# Patient Record
Sex: MALE | Race: WHITE
[De-identification: names, ages, dates, MRNs, and addresses within clinical notes are randomized per-mention and may not be internally consistent; named-entity substitution may affect disease eponyms.]

---

## 2018-02-25 ENCOUNTER — HOSPITAL ENCOUNTER (INPATIENT)
Dept: HOSPITAL 80 - FED | Age: 83
LOS: 15 days | Discharge: NURSING, REHAB OR LONG-TERM CARE | DRG: 871 | End: 2018-03-12
Attending: INTERNAL MEDICINE | Admitting: INTERNAL MEDICINE
Payer: COMMERCIAL

## 2018-02-25 DIAGNOSIS — E87.8: ICD-10-CM

## 2018-02-25 DIAGNOSIS — I48.91: ICD-10-CM

## 2018-02-25 DIAGNOSIS — G93.41: ICD-10-CM

## 2018-02-25 DIAGNOSIS — B96.1: ICD-10-CM

## 2018-02-25 DIAGNOSIS — L89.152: ICD-10-CM

## 2018-02-25 DIAGNOSIS — E43: ICD-10-CM

## 2018-02-25 DIAGNOSIS — E11.10: ICD-10-CM

## 2018-02-25 DIAGNOSIS — R65.20: ICD-10-CM

## 2018-02-25 DIAGNOSIS — N17.9: ICD-10-CM

## 2018-02-25 DIAGNOSIS — E87.5: ICD-10-CM

## 2018-02-25 DIAGNOSIS — E87.2: ICD-10-CM

## 2018-02-25 DIAGNOSIS — J18.9: ICD-10-CM

## 2018-02-25 DIAGNOSIS — E87.0: ICD-10-CM

## 2018-02-25 DIAGNOSIS — R00.0: ICD-10-CM

## 2018-02-25 DIAGNOSIS — J96.01: ICD-10-CM

## 2018-02-25 DIAGNOSIS — A41.89: Primary | ICD-10-CM

## 2018-02-25 DIAGNOSIS — J10.00: ICD-10-CM

## 2018-02-25 DIAGNOSIS — B97.89: ICD-10-CM

## 2018-02-25 DIAGNOSIS — I10: ICD-10-CM

## 2018-02-25 DIAGNOSIS — Z66: ICD-10-CM

## 2018-02-25 LAB
INR PPP: 1.14 (ref 0.83–1.16)
PLATELET # BLD: 264 10^3/UL (ref 150–400)
PROTHROMBIN TIME: 14.8 SEC (ref 12–15)

## 2018-02-25 PROCEDURE — C1751 CATH, INF, PER/CENT/MIDLINE: HCPCS

## 2018-02-25 PROCEDURE — P9041 ALBUMIN (HUMAN),5%, 50ML: HCPCS

## 2018-02-25 PROCEDURE — G0480 DRUG TEST DEF 1-7 CLASSES: HCPCS

## 2018-02-25 NOTE — PDGENHP
History and Physical





- Chief Complaint


altered mental status





- History of Present Illness





Source-patient currently encephalopathic and nonverbal.  EMR was reviewed and 

case discussed with accepting provider.  Patient resides at [facility] and no records accompany the patient.  Calls to facility 

placed but no records available to be faxed either as patient is on [facility] facility side. .





HPI - 82-year-old gentleman with unknown past medical history who presents 

emergency department from his independent [facility] apartment at [facility] after 

welfare check was called.  Apparently patient had not been seen in 24 hr last 

being approximately dinnertime.  Patient was found in his apartment confused.  

Per report no emptied or prescription bottles were in the home.  Initial blood 

glucose on scene was greater than measurable on Accu-Chek.





In the emergency department patient's glucose was found to be greater than 

1000. He was given greater than 4 L of normal saline bolus.  Patient has 

remained confused and agitated.





History Information





- Allergies/Home Medication List


Allergies/Adverse Reactions: 








Unable to Assess Allergy (Unverified 18 20:11)


 





Home Medications: 








Meclizine HCl  18 [Last Taken Unknown]


Zofran  18 [Last Taken Unknown]





I have personally reviewed and updated: social history


Past Medical History: Unable to obtain secondary to patient's altered mental 

status.  Attempts to call contacts





- Surgical History


Additional surgical history: Unable to obtain secondary to patient's mental 

status.  No reports available from [facility].





- Family History


Additional family history: Unable to obtain as noted above.





- Social History


Smoking Status: Unknown if ever smoked


Additional social history: Unable to obtain as noted above.  Patient without 

any available advance directives as it is not required at [facility].  Patient will be full code until this can be clarified.  

Patient's available contacts are all .





Review of Systems


Review of Systems: 


Unable to obtain as for reasons noted above.





Physical Exam


Physical Exam: 








 Selected Entries











  18





  21:37


 


Blood Pressure Automatic





Method 


 


Heart Rate 102 H


 


Respiratory 32 H





Rate 


 


O2 Sat (%) 94


 


Blood Pressure 113/68


 


Mean Arterial 83





Pressure (MAP) 


 


O2 Delivery Room Air





Mode 




















Temp Pulse Resp BP Pulse Ox


 


 35.8 C L  113 H  32 H  139/66 H  93 


 


 18 22:58  18 22:58  18 22:58  18 22:58  18 22:58




















O2 (L/minute)                  4














Constitutional: no apparent distress, chronically ill appearing, cachectic, 

other (Patient lays in bed awake but unresponsive.  Moans intermittently.  

Nonverbal.  Chronically ill, thin cachectic, disheveled.), No appears nourished


Eyes: PERRL


Ears, Nose, Mouth, Throat: no oral mucosal ulcers, dry mucous membranes, other (

Edentulous)


Cardiovascular: systolic murmur, pulses symmetric bilaterally, tachycardia (

Regular rhythm with occasional extra beat.), No edema


Peripheral Pulses: 2+: dorsalis-pedis (R), dorsalis-pedis (L)


Respiratory: no respiratory distress, no rales or rhonchi, clear to auscultation

, reduced air movement (Decreased inspiratory effort.  Patient unable to follow 

commands.), No expiratory wheeze, No respiratory distress


Gastrointestinal: normoactive bowel sounds, soft, non-tender abdomen, no 

palpable masses, No tenderness (No apparent tenderness to palpation with exam.)

, No guarding, No distension


Genitourinary: kothari in urethra (Clear yellow urine)


Skin: warm, normal color, No no rashes or abrasions, No pressure ulcer, No rash


Musculoskeletal: other (Patient moves all extremities while lying in bed.  

Strength 5/5 in his extremities.  Patient unable to follow commands for testing.

)


Neurologic: other ( limited exam 2/2 confusion and inability to follow commands)

, No AAOx3, No weakness, No facial droop


Psychiatric: encephalopathic, agitated (Patient resisting nursing staff 

assistance.  Nonverbal.), No interacting appropriately


Lymph, Heme, Immunologic: No petechiae





Lab Data & Imaging Review





 18 20:12





 18 22:00














WBC  14.63 10^3/uL (3.80-9.50)  H  18  20:12    


 


RBC  6.30 10^6/uL (4.40-6.38)   18  20:12    


 


Hgb  20.8 g/dL (13.7-17.5)  H*  18  20:12    


 


POC Hgb  17.0 gm/dL (13.7-17.5)   18  21:54    


 


Hct  62.4 % (40.0-51.0)  H*  18  20:12    


 


POC Hct  50 % (40-51)   18  21:54    


 


MCV  99.0 fL (81.5-99.8)   18  20:12    


 


MCH  33.0 pg (27.9-34.1)   18  20:12    


 


MCHC  33.3 g/dL (32.4-36.7)   18  20:12    


 


RDW  14.0 % (11.5-15.2)   18  20:12    


 


Plt Count  264 10^3/uL (150-400)   18  20:12    


 


MPV  12.4 fL (8.7-11.7)  H  18  20:12    


 


Neut % (Auto)  81.5 % (39.3-74.2)  H  18  20:12    


 


Lymph % (Auto)  6.8 % (15.0-45.0)  L  18  20:12    


 


Mono % (Auto)  10.7 % (4.5-13.0)   18  20:    


 


Eos % (Auto)  0.0 % (0.6-7.6)  L  18  20:    


 


Baso % (Auto)  0.2 % (0.3-1.7)  L  18  20:12    


 


Nucleat RBC Rel Count  0.0 % (0.0-0.2)   18  20:12    


 


Absolute Neuts (auto)  11.92 10^3/uL (1.70-6.50)  H  18  20:12    


 


Absolute Lymphs (auto)  1.00 10^3/uL (1.00-3.00)   18  20:12    


 


Absolute Monos (auto)  1.56 10^3/uL (0.30-0.80)  H  18  20:12    


 


Absolute Eos (auto)  0.00 10^3/uL (0.03-0.40)  L  18  20:12    


 


Absolute Basos (auto)  0.03 10^3/uL (0.02-0.10)   18  20:    


 


Absolute Nucleated RBC  0.00 10^3/uL (0-0.01)   02/25/18  20:12    


 


Immature Gran %  0.8 % (0.0-1.1)   18  20:12    


 


Immature Gran #  0.12 10^3/uL (0.00-0.10)  H  18  20:12    


 


PT  14.8 SEC (12.0-15.0)   18  20:12    


 


INR  1.14  (0.83-1.16)   18  20:12    


 


APTT  31.9 SEC (23.0-38.0)   18  20:12    


 


VBG pH  7.14  (7.31-7.42)  L*  18  20:12    


 


VBG Lactic Acid  4.4 mmol/L (0.7-2.1)  H  18  21:19    


 


POC Sodium  153 mEq/L (135-145)  H  18  21:54    


 


Sodium  155 mEq/L (135-145)  H  18  22:00    


 


POC Potassium  5.3 mEq/L (3.3-5.0)  H  18  21:54    


 


Potassium  5.4 mEq/L (3.5-5.2)  H  18  22:00    


 


POC Chloride  117 mEq/L ()  H  18  21:54    


 


Chloride  113 mEq/L ()  H D 18  22:00    


 


Carbon Dioxide  12 mEq/l (22-31)  L  18  22:00    


 


Anion Gap  30 mEq/L (8-16)  H  18  22:00    


 


POC BUN  98 mg/dL (7-23)  H  18  21:54    


 


BUN  100 mg/dL (7-23)  H  18  22:00    


 


Creatinine  3.3 mg/dL (0.7-1.3)  H  18  22:00    


 


POC Creatinine  3.1 mg/dL (0.7-1.3)  H  18  21:54    


 


Estimated GFR  18   18  22:00    


 


Glucose  859 mg/dL ()  H*  18  22:00    


 


POC Glucose  > 700 mg/dL ()  H*  18  21:54    


 


Serum Osmolality  429 mosmo/kg (280-297)  H  18  20:12    


 


Calcium  8.3 mg/dL (8.5-10.4)  L D 18  22:00    


 


Phosphorus  11.7 mg/dL (2.5-4.5)  H  18  20:12    


 


Magnesium  3.9 mg/dL (1.6-2.3)  H  18  20:12    


 


Total Bilirubin  1.0 mg/dL (0.1-1.4)   18  22:00    


 


Conjugated Bilirubin  0.9 mg/dL (0.0-0.5)  H  18  22:00    


 


Unconjugated Bilirubin  0.1 mg/dL (0.0-1.1)   18  22:00    


 


AST  29 IU/L (17-59)   18  22:00    


 


ALT  22 IU/L (21-72)   18  22:00    


 


Alkaline Phosphatase  93 IU/L ()   18  22:00    


 


Troponin I  0.023 ng/mL (0.000-0.034)   18  20:12    


 


Total Protein  6.7 g/dL (6.3-8.2)   18  22:00    


 


Albumin  4.1 g/dL (3.5-5.0)   18  22:00    


 


Beta-Hydroxybutyrate  9.35 mmol/L (0.02-0.27)  H  18  20:12    


 


Urine Color  YELLOW   18  20:25    


 


Urine Appearance  CLEAR   18  20:25    


 


Urine pH  5.0  (5.0-7.5)   18:25    


 


Ur Specific Gravity  1.021  (1.002-1.030)   18  20:25    


 


Urine Protein  1+  (NEGATIVE)  H  18  20:25    


 


Urine Ketones  TRACE  (NEGATIVE)  H  18  20:    


 


Urine Blood  3+  (NEGATIVE)  H  18  20:25    


 


Urine Nitrate  NEGATIVE  (NEGATIVE)   18  20:    


 


Urine Bilirubin  NEGATIVE  (NEGATIVE)   18  20:    


 


Urine Urobilinogen  NEGATIVE EU (0.2-1.0)   18  20:25    


 


Ur Leukocyte Esterase  NEGATIVE  (NEGATIVE)   18  20:25    


 


Urine RBC  1-3 /hpf (0-3)   18  20:25    


 


Urine WBC  3-5 /hpf (0-3)  H  18  20:25    


 


Ur Epithelial Cells  TRACE /lpf (NONE-1+)   18  20:25    


 


Hyaline Casts  1-5 /lpf (0-1)   18  20:25    


 


Urine Mucus  TRACE /lpf (NONE-1+)   18  20:25    


 


Urine Glucose  3+  (NEGATIVE)  H  18  20:25    








Imaging Review: 





 


Portable Chest at 8 hours 


 


 History: Chest Pain. Altered mental status. 


 


 Comparison: None available. 


 


 Findings: Lung volumes are low without focal consolidation, pneumothorax, or 

pleural effusion. 


Heart size is normal. Degenerative change is present in the spine and 

shoulders. 


 


 


Impression: No acute findings in the chest. 


______________________





CT Head Without Contrast 


 


 History: AMS. Hyperglycemia. 


 


 Comparison: None available. 


 


 Technique: Axial unenhanced images were obtained from the vertex through the 

skull base.  Dose 


reduction techniques were utilized. 


 


 Findings: Focal CSF prominence just to the left of the falx in the vertex, 

measuring 2.5 x 1.5 cm (


series 9 image 101) could represent an arachnoid cyst. Gray-white 

differentiation is preserved. 


There is moderate diffuse cerebral atrophy with scattered periventricular and 

subcortical low 


attenuation, suggesting chronic microvascular ischemic gliosis. No intracranial 

hemorrhage is 


identified. There is no evidence of infarct.  Atherosclerotic calcification is 

present in the 


distal internal carotid arteries. The skull and skull base are unremarkable. 

Mild mucous membrane 


thickening is present in the paranasal sinuses.  The mastoid air cells are 

clear. 


 


 


Impression: 


1. No acute intracranial findings. 


2. Diffuse cerebral atrophy with periventricular and subcortical low 

attenuation consistent with 


chronic microvascular ischemic gliosis. 


3. 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 


 


Findings discussed with Yaron Suero MD on 2018 at 2131 hours. 


 





Visualized and Interpreted Chest x-ray results: Yes


Visualized and Interpreted imaging results: Yes


Visualized and Interpreted EKG results: Yes


EKG additional interpertation: Initial EKG-showing AFib with RVR in the 120s, 

PVCs, Q-waves in the inferior leads with prolonged QTC.  Repeat EKG showed 

sinus tachycardia in the 1 teens with PVCs and Q-waves in the inferior leads.  

QTC is 478. No acute ST elevations or depressions.





Assessment & Plan


Assessment: 








82-year-old gentleman found confused at his independent living apartment with 

elevated blood sugars





Hyperglycemia-HHS versus DKA.  Patient's blood sugar initially was greater than 

1000 however he has elevated serum ketones.  Patient has been placed on the DKA 

protocol and has received aggressive IV fluid hydration of 4.5 L normal saline.

  Patient will continue on insulin drip.  Serial BMPs, VBG, lactic acid.  Labs 

are all down trending except for sodium.  See discussion below.





Acute encephalopathy - likely related to hyperglycemia versus metabolic 

acidosis versus azotemia versus less likely CVA.  Despite aggressive IV fluid 

hydration and improvement in patient's glucose up to this point patient remains 

quite agitated and unresponsive to verbal cues.  Patient with increasing 

agitation and requiring restraint with 2 point soft limb restraints and 

Precedex.  Continuing with insulin and acidosis correction as noted.  Attempts 

were made to contact patient's listed contacts however apparently they are 

.  Case Management will be consulted to assist.





Severe sepsis-patient qualifies with leukocytosis, tachycardia, elevated lactate

, acute renal failure - chest x-ray, UA at this point are negative for evidence 

of acute infectious process.  Blood cultures x2 are pending.  Will hold off on 

any antibiotics at this time unless patient should develop fever or cultures 

returned positive.





Lactic acidosis - downtrending after IV fluids continue with aggressive 

hydration as noted.  Further evaluation for potential source most likely severe 

dehydration versus infectious process.  Blood cultures are pending as noted 

above.  Serial lactic acids.





Hypernatremia - patient initially hypernatremic to 1 50.  Status post 4.5 L a 

normal saline in the emergency department now with a sodium of 160. His fluids 

had initially been changed to half normal saline at 125 mLs/hour  Patient has 

been making urine since receiving IV fluids.  Urine studies have been added.  

Nephrology consulted and case discussed with .  Recommends changed 

to D5W as patient is blood glucose is downtrending and on insulin drip at 1:25 

a.m. MLS per hour as well.  Will continue to monitor serial BMPs.  Nephrology 

will see patient in the morning. 





Acute renal failure - most likely prerenal in setting of severe dehydration.  

Patient has not had any issues with hypotension since his arrival.  His initial 

blood pressure however was low normal and has since increased since arrival to 

the unit after IV fluids.  Repeat before and BMPs show improvement in renal 

function.





Atrial fibrillation (Acute) - patient appears to be normal sinus rhythm status 

post IV fluids and initiation of glucose correction.  Will repeat a EKG in the 

morning and obtain echocardiogram.  Patient's previous history is unknown.  PT 

INR within normal limits.





Hypothermia (Acute) - resolved status post bear hugger.  The sepsis eval as 

noted above.  Patient has been able to maintain normalized temperature.





Polycythemia - secondary to severe dehydration.  Currently corrected on repeat 

laboratory studies.  Continue IV fluids.





Hyperkalemia - status post aggressive IV fluid hydration and now corrected.  

Patient does not need further treatment other than IV fluids.





Hyperchloremia - status post 4 L normal saline.  transitioned to D5 W. Serial 

BMPs.





Microscopic hematuria - possibly related to traumatic Kothari placement.  Patient 

without any gross hematuria at this time.  No previous records available for 

comparison.  Consider renal imaging in the morning pending repeat renal 

function labs.





Under weight-patient's current weight is 54.4 kg.  He does appear slightly 

cachectic.  Once patient's mentation improves any can safely have a diet will 

consult RD.





FEN - IVF as noted above. electrolyte replacement if needed currently 

acceptable. Diet NPO. 


PPX - SCDs. lovenox. 


COR - FULL at this time.  Despite attempts for staff to locate any advanced 

directives as patient was on the independent living side these are not required 

to be filed.  Patient's listed contacts are thought to be .


Dispo - patient admitted to ICU.  He is critically ill requiring close 

monitoring.  Patient has been admitted to inpatient status and anticipate 

greater than 2 midnight stay.





Consult-nephrology.  Case Management.

## 2018-02-25 NOTE — CPEKG
Heart Rate: 116

RR Interval: 517

P-R Interval: 156

QRSD Interval: 76

QT Interval: 344

QTC Interval: 478

P Axis: -2

QRS Axis: -30

T Wave Axis: 38

EKG Severity - ABNORMAL ECG -

EKG Impression: SINUS TACHYCARDIA

EKG Impression: MULTIFORM VENTRICULAR PREMATURE COMPLEXES

EKG Impression: PROBABLE INFERIOR INFARCT, OLD

Electronically Signed By: Yaron Suero 25-Feb-2018 23:28:42

## 2018-02-25 NOTE — CPEKG
Heart Rate: 121

RR Interval: 496

QRSD Interval: 106

QT Interval: 384

QTC Interval: 545

QRS Axis: 1

T Wave Axis: 60

EKG Severity - ABNORMAL ECG -

EKG Impression: ATRIAL FIBRILLATION

EKG Impression: MULTIFORM VENTRICULAR PREMATURE COMPLEXES

EKG Impression: PROBABLE INFERIOR INFARCT, OLD

EKG Impression: PROLONGED QT INTERVAL

Electronically Signed By: Yaron Suero 25-Feb-2018 23:28:48

## 2018-02-25 NOTE — EDPHY
H & P


Time Seen by Provider: 02/25/18 20:14


HPI/ROS: 





Chief complaint.  Altered mental status





HPI.  82-year-old male here emergent by EMS.  He was last seen normal 24 hr 

ago.  Welfare check found the patient to be barely arousable in his apartment 

in assisted living.  Monitor showed atrial fibrillation per EMS.  When they 

checked his blood sugar there monitor showed it was greater than 400. There is 

a report that there was no medicine bottles around.  Apparently there is no 

history of diabetes.  Patient is not able to give any further history.





ROS--unable as the patient is nonverbal





Past Medical/Surgical History: 





Unknown


Social History: 





Unknown though EMS report is the patient lives by himself in assisted living


Smoking Status: Unknown if ever smoked


Physical Exam: 





General Appearance:  Arousable as the patient opens his eyes to verbal stimuli.

  Patient's initial temp is 34.7 degrees.  Heart rate 103


Eyes: Pupils equal and round no pallor or injection.


ENT, mucous membranes are dry


Respiratory:  There are no retractions, lungs are clear to auscultation.


Cardiovascular:  Irregularly irregular rate and rhythm


Gastrointestinal:   Abdomen is soft and nontender, no masses, bowel sounds 

normal.


Neurological:  Opens eyes to verbal stimuli.  Appears to move all 4 extremities


Skin:  Skin is mottled


Musculoskeletal:  Neck is supple nontender.


Extremities  symmetrical, full range of motion.


Psychiatric:  Arousable


Constitutional: 


 Initial Vital Signs











Temperature (C)  34.7 C L  02/25/18 20:09


 


Heart Rate  103 H  02/25/18 20:09


 


Respiratory Rate  33 H  02/25/18 20:09


 


Blood Pressure  156/80 H  02/25/18 20:09


 


O2 Sat (%)  96   02/25/18 20:09








 











O2 Delivery Mode               Nasal Cannula


 


O2 (L/minute)                  4














Allergies/Adverse Reactions: 


 





Unable to Assess Allergy (Unverified 02/25/18 20:11)


 








Home Medications: 














 Medication  Instructions  Recorded


 


Meclizine HCl  02/25/18


 


Zofran  02/25/18














Medical Decision Making





- Diagnostics


EKG Interpretation: 





EKG interpreted by me shows atrial fibrillation left axis deviation QRS appears 

normal.  No obvious ST elevation or depression.  Ventricular response is 121


Imaging Results: 


 Imaging Impressions





Chest X-Ray  02/25/18 20:15


Impression: No acute findings in the chest.


 


 


 








Head CT  02/25/18 20:20


Impression:


1. No acute intracranial findings.


2. Diffuse cerebral atrophy with periventricular and subcortical low 

attenuation consistent with chronic microvascular ischemic gliosis.


3. 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


 


Findings discussed with Yaron Suero MD on February 25, 2018 at 2131 hours.








Chest x-ray reviewed by me and interpreted by me shows no evidence for pneumonia





Noncontrast head CT reviewed by me and discussed with Dr. Aguilar is nonacute


Procedures: 





IV normal saline.  2nd IV is started is given 2 L of normal saline.  I-STAT 

shows blood sugar greater than 700





Sepsis workup is performed





Patient is given 4 L of saline in the department.  Insulin drip.


ED Course/Re-evaluation: 





Severe sepsis declared at about 8:50 p.m.





Bear Hugger blanket to warm the patient back to normothermia





10:15 p.m. Patient has now rapid AFib in the range of 140-150.





Diltiazem bolus and drip.





I consulted and discussed case with Dr. Macedo, hospitalist, who agrees to the 

admission








Differential Diagnosis: 





It appears the patient is in nonketotic hyperosmolar coma.  However the patient 

is acidotic however I suspect that this is more from for circulation as well as 

this is the explanation for the elevated lactate.  Concurrently there is no 

obvious evidence for infection in terms of pneumonia or urinary tract 

infection.  Cultures are pending.  No antibiotics were given because again at 

this point no source of infection.





Patient has atrial fibrillation with rapid ventricular rate requiring


Diltiazem for management.


Patient's very hyperglycemic as well as having elevated oz mole allergy.  We 

this requires treatment with IV insulin and IV fluids.


Critical Care Time: 





Critical care time exclusive procedures 1 hr





- Data Points


Laboratory Results: 


 Laboratory Results





 02/25/18 20:12 





 02/25/18 20:12 





 











  02/25/18 02/25/18 02/25/18





  20:25 20:12 20:12


 


WBC      





    


 


RBC      





    


 


Hgb      





    


 


POC Hgb      





    


 


Hct      





    


 


POC Hct      





    


 


MCV      





    


 


MCH      





    


 


MCHC      





    


 


RDW      





    


 


Plt Count      





    


 


MPV      





    


 


Neut % (Auto)      





    


 


Lymph % (Auto)      





    


 


Mono % (Auto)      





    


 


Eos % (Auto)      





    


 


Baso % (Auto)      





    


 


Nucleat RBC Rel Count      





    


 


Absolute Neuts (auto)      





    


 


Absolute Lymphs (auto)      





    


 


Absolute Monos (auto)      





    


 


Absolute Eos (auto)      





    


 


Absolute Basos (auto)      





    


 


Absolute Nucleated RBC      





    


 


Immature Gran %      





    


 


Immature Gran #      





    


 


PT      





    


 


INR      





    


 


APTT      





    


 


VBG pH    7.14  L*   





    (7.31-7.42)  


 


VBG Lactic Acid    6.4 mmol/L H mmol/L  





    (0.7-2.1)  


 


POC Sodium      





    


 


Sodium      152 mEq/L H mEq/L





     (135-145) 


 


POC Potassium      





    


 


Potassium      6.1 mEq/L H mEq/L





     (3.5-5.2) 


 


POC Chloride      





    


 


Chloride      102 mEq/L mEq/L





     () 


 


Carbon Dioxide      9 mEq/l L* mEq/l





     (22-31) 


 


Anion Gap      41 mEq/L H mEq/L





     (8-16) 


 


POC BUN      





    


 


BUN      101 mg/dL H* mg/dL





     (7-23) 


 


Creatinine      3.6 mg/dL H mg/dL





     (0.7-1.3) 


 


POC Creatinine      





    


 


Estimated GFR      16 





    


 


Glucose      1023 mg/dL H* mg/dL





     () 


 


POC Glucose      





    


 


Serum Osmolality      429 mosmo/kg H mosmo/kg





     (280-297) 


 


Calcium      10.2 mg/dL mg/dL





     (8.5-10.4) 


 


Phosphorus      11.7 mg/dL H mg/dL





     (2.5-4.5) 


 


Magnesium      3.9 mg/dL H mg/dL





     (1.6-2.3) 


 


Total Bilirubin      1.4 mg/dL mg/dL





     (0.1-1.4) 


 


Troponin I      0.023 ng/mL ng/mL





     (0.000-0.034) 


 


Beta-Hydroxybutyrate      9.35 mmol/L H mmol/L





     (0.02-0.27) 


 


Urine Color  YELLOW     





    


 


Urine Appearance  CLEAR     





    


 


Urine pH  5.0     





   (5.0-7.5)   


 


Ur Specific Gravity  1.021     





   (1.002-1.030)   


 


Urine Protein  1+  H     





   (NEGATIVE)   


 


Urine Ketones  TRACE  H     





   (NEGATIVE)   


 


Urine Blood  3+  H     





   (NEGATIVE)   


 


Urine Nitrate  NEGATIVE     





   (NEGATIVE)   


 


Urine Bilirubin  NEGATIVE     





   (NEGATIVE)   


 


Urine Urobilinogen  NEGATIVE EU EU    





   (0.2-1.0)   


 


Ur Leukocyte Esterase  NEGATIVE     





   (NEGATIVE)   


 


Urine RBC  1-3 /hpf /hpf    





   (0-3)   


 


Urine WBC  3-5 /hpf H /hpf    





   (0-3)   


 


Ur Epithelial Cells  TRACE /lpf /lpf    





   (NONE-1+)   


 


Hyaline Casts  1-5 /lpf /lpf    





   (0-1)   


 


Urine Mucus  TRACE /lpf /lpf    





   (NONE-1+)   


 


Urine Glucose  3+  H     





   (NEGATIVE)   














  02/25/18 02/25/18 02/25/18





  20:12 20:12 20:03


 


WBC    14.63 10^3/uL H 10^3/uL  





    (3.80-9.50)  


 


RBC    6.30 10^6/uL 10^6/uL  





    (4.40-6.38)  


 


Hgb    20.8 g/dL H* g/dL  





    (13.7-17.5)  


 


POC Hgb      20.7 gm/dL H* gm/dL





     (13.7-17.5) 


 


Hct    62.4 % H* %  





    (40.0-51.0)  


 


POC Hct      61 % H* %





     (40-51) 


 


MCV    99.0 fL fL  





    (81.5-99.8)  


 


MCH    33.0 pg pg  





    (27.9-34.1)  


 


MCHC    33.3 g/dL g/dL  





    (32.4-36.7)  


 


RDW    14.0 % %  





    (11.5-15.2)  


 


Plt Count    264 10^3/uL 10^3/uL  





    (150-400)  


 


MPV    12.4 fL H fL  





    (8.7-11.7)  


 


Neut % (Auto)    81.5 % H %  





    (39.3-74.2)  


 


Lymph % (Auto)    6.8 % L %  





    (15.0-45.0)  


 


Mono % (Auto)    10.7 % %  





    (4.5-13.0)  


 


Eos % (Auto)    0.0 % L %  





    (0.6-7.6)  


 


Baso % (Auto)    0.2 % L %  





    (0.3-1.7)  


 


Nucleat RBC Rel Count    0.0 % %  





    (0.0-0.2)  


 


Absolute Neuts (auto)    11.92 10^3/uL H 10^3/uL  





    (1.70-6.50)  


 


Absolute Lymphs (auto)    1.00 10^3/uL 10^3/uL  





    (1.00-3.00)  


 


Absolute Monos (auto)    1.56 10^3/uL H 10^3/uL  





    (0.30-0.80)  


 


Absolute Eos (auto)    0.00 10^3/uL L 10^3/uL  





    (0.03-0.40)  


 


Absolute Basos (auto)    0.03 10^3/uL 10^3/uL  





    (0.02-0.10)  


 


Absolute Nucleated RBC    0.00 10^3/uL 10^3/uL  





    (0-0.01)  


 


Immature Gran %    0.8 % %  





    (0.0-1.1)  


 


Immature Gran #    0.12 10^3/uL H 10^3/uL  





    (0.00-0.10)  


 


PT  14.8 SEC SEC    





   (12.0-15.0)   


 


INR  1.14     





   (0.83-1.16)   


 


APTT  31.9 SEC SEC    





   (23.0-38.0)   


 


VBG pH      





    


 


VBG Lactic Acid      





    


 


POC Sodium      150 mEq/L H mEq/L





     (135-145) 


 


Sodium      





    


 


POC Potassium      5.6 mEq/L H mEq/L





     (3.3-5.0) 


 


Potassium      





    


 


POC Chloride      114 mEq/L H mEq/L





     () 


 


Chloride      





    


 


Carbon Dioxide      





    


 


Anion Gap      





    


 


POC BUN      93 mg/dL H mg/dL





     (7-23) 


 


BUN      





    


 


Creatinine      





    


 


POC Creatinine      3.4 mg/dL H mg/dL





     (0.7-1.3) 


 


Estimated GFR      





    


 


Glucose      





    


 


POC Glucose      > 700 mg/dL H* mg/dL





     () 


 


Serum Osmolality      





    


 


Calcium      





    


 


Phosphorus      





    


 


Magnesium      





    


 


Total Bilirubin      





    


 


Troponin I      





    


 


Beta-Hydroxybutyrate      





    


 


Urine Color      





    


 


Urine Appearance      





    


 


Urine pH      





    


 


Ur Specific Gravity      





    


 


Urine Protein      





    


 


Urine Ketones      





    


 


Urine Blood      





    


 


Urine Nitrate      





    


 


Urine Bilirubin      





    


 


Urine Urobilinogen      





    


 


Ur Leukocyte Esterase      





    


 


Urine RBC      





    


 


Urine WBC      





    


 


Ur Epithelial Cells      





    


 


Hyaline Casts      





    


 


Urine Mucus      





    


 


Urine Glucose      





    











Medications Given: 


 





Insulin Human Regular 100 unit / Miscellaneous Medication 1 ea/ Sodium Chloride

  101 mls @ 6 mls/hr IV EDNOW ONE


   Stop: 02/26/18 13:44


   Last Admin: 02/25/18 21:11 Dose:  101 mls


Sodium Chloride (Ns)  1,600 mls @ 266.6666 mls/hr 30 ml/kg infuse over 6 hr (

1600 ml) IV EDNOW ONE


   PRN Reason: Protocol


   Stop: 02/26/18 02:54


   Last Admin: 02/25/18 22:06 Dose:  1,600 mls





Discontinued Medications





Sodium Chloride (Ns)  1,000 mls @ 0 mls/hr IV EDNOW ONE; Wide Open


   PRN Reason: Protocol


   Stop: 02/25/18 20:16


   Last Admin: 02/25/18 20:00 Dose:  1,000 mls


Sodium Chloride (Ns)  1,000 mls @ 0 mls/hr IV ONCE ONE; Wide Open


   PRN Reason: Protocol


   Stop: 02/25/18 20:16


   Last Admin: 02/25/18 20:28 Dose:  1,000 mls


Sodium Chloride (Ns)  1,000 mls @ 0 mls/hr IV ONCE ONE


   PRN Reason: Wide Open


   Stop: 02/25/18 22:07


   Last Admin: 02/25/18 22:06 Dose:  1,000 mls


Lorazepam (Ativan Injection)  1 mg IVP EDNOW ONE


   Stop: 02/25/18 20:58


   Last Admin: 02/25/18 21:08 Dose:  1 mg





Point of Care Test Results: 


 











  02/25/18





  20:03


 


POC Sodium  150 H


 


POC Potassium  5.6 H


 


POC Chloride  114 H


 


POC BUN  93 H


 


POC Creatinine  3.4 H


 


POC Glucose  > 700 H*














Departure





- Departure


Disposition: [facility] Inpatient Acute


Clinical Impression: 


 Non-ketotic hyperosmolar coma





Hypothermia


Qualifiers:


 Encounter type: initial encounter Qualified Code(s): T68.XXXA - Hypothermia, 

initial encounter





Atrial fibrillation


Qualifiers:


 Atrial fibrillation type: unspecified Qualified Code(s): I48.91 - Unspecified 

atrial fibrillation





Condition: Critical

## 2018-02-26 LAB
CK SERPL-CCNC: 866 IU/L (ref 0–224)
CK SERPL-CCNC: 945 IU/L (ref 0–224)
PLATELET # BLD: 158 10^3/UL (ref 150–400)

## 2018-02-26 PROCEDURE — 02HV33Z INSERTION OF INFUSION DEVICE INTO SUPERIOR VENA CAVA, PERCUTANEOUS APPROACH: ICD-10-PCS | Performed by: RADIOLOGY

## 2018-02-26 RX ADMIN — DEXMEDETOMIDINE HYDROCHLORIDE SCH MLS: 4 INJECTION, SOLUTION INTRAVENOUS at 01:20

## 2018-02-26 RX ADMIN — DEXTROSE MONOHYDRATE SCH MLS: 5 INJECTION, SOLUTION INTRAVENOUS at 12:17

## 2018-02-26 RX ADMIN — DEXMEDETOMIDINE HYDROCHLORIDE SCH MLS: 4 INJECTION, SOLUTION INTRAVENOUS at 05:30

## 2018-02-26 RX ADMIN — HEPARIN SODIUM SCH UNIT: 5000 INJECTION, SOLUTION INTRAVENOUS; SUBCUTANEOUS at 17:24

## 2018-02-26 RX ADMIN — DEXMEDETOMIDINE HYDROCHLORIDE SCH MLS: 4 INJECTION, SOLUTION INTRAVENOUS at 23:38

## 2018-02-26 RX ADMIN — INSULIN LISPRO SCH UNIT: 100 INJECTION, SOLUTION INTRAVENOUS; SUBCUTANEOUS at 23:42

## 2018-02-26 RX ADMIN — DEXMEDETOMIDINE HYDROCHLORIDE SCH MLS: 4 INJECTION, SOLUTION INTRAVENOUS at 17:38

## 2018-02-26 RX ADMIN — HEPARIN SODIUM SCH UNIT: 5000 INJECTION, SOLUTION INTRAVENOUS; SUBCUTANEOUS at 22:05

## 2018-02-26 RX ADMIN — HEPARIN SODIUM SCH UNIT: 5000 INJECTION, SOLUTION INTRAVENOUS; SUBCUTANEOUS at 06:04

## 2018-02-26 RX ADMIN — DEXTROSE MONOHYDRATE SCH MLS: 5 INJECTION, SOLUTION INTRAVENOUS at 01:26

## 2018-02-26 RX ADMIN — DEXTROSE MONOHYDRATE SCH MLS: 5 INJECTION, SOLUTION INTRAVENOUS at 17:37

## 2018-02-26 RX ADMIN — INSULIN LISPRO SCH UNIT: 100 INJECTION, SOLUTION INTRAVENOUS; SUBCUTANEOUS at 17:23

## 2018-02-26 RX ADMIN — INSULIN LISPRO SCH UNIT: 100 INJECTION, SOLUTION INTRAVENOUS; SUBCUTANEOUS at 12:18

## 2018-02-26 NOTE — PDMN
Medical Necessity


Medical necessity: est los>2mn for hyperglycemia, HHS vs DKA, acute 

encephalopathy r/t hyperglycemia vs metabolic acidosis vs azotemia vs CVA, 

severe sepsis, and hypernatremia;  admit to ICU, insulin gtt, IVF, requiring 2 

point restraints; medical hx unknown; per order and H&P 2/25/18

## 2018-02-26 NOTE — ASMTCMCOM
CM Note

 

CM Note                       

Notes:

82 yr old male found confused in his Independent Living apartment in [facility]. High blood 

sugars-greater than 1000, confused and agitated. [facility] looking for Advance Directives and 

contacts, so far none found. CM to follow.

 

Date Signed:  02/26/2018 09:17 AM

Electronically Signed By:Harika Ho LCSW

## 2018-02-26 NOTE — ASMTCMCOM
CM Note

 

CM Note                       

Notes:

Phillip Cárdenas called back to say that she had given me the wrong # for a "" for the 

patient. This CM contacted Delfina Meredith [phone] ; [phone] . Delfina reports that 

patient has no living family and as far as she knew was his only living friend. She was happy to 

assist by being patient's Med Proxy. Delfina lives in [facility]. This CM sent her a picture of 


the Med Proxy form and instructions, asked the ICU RN to call her and gave her phone#'s to the 

Intensivist to call as needed.

 

Date Signed:  02/26/2018 04:06 PM

Electronically Signed By:Harika Ho LCSW

## 2018-02-26 NOTE — CPEKG
Heart Rate: 83

RR Interval: 723

P-R Interval: 148

QRSD Interval: 76

QT Interval: 392

QTC Interval: 461

P Axis: 12

QRS Axis: 12

T Wave Axis: 82

EKG Severity - BORDERLINE ECG -

EKG Impression: SINUS RHYTHM

EKG Impression: BORDERLINE T WAVE ABNORMALITIES

Electronically Signed By: Hugo Perez 26-Feb-2018 09:23:45

## 2018-02-26 NOTE — SOAPPROG
SOAP Progress Note


Assessment/Plan: 


Assessment:


1. Hypernatremia.


Due to hyperglycemia/osmotic diuresis. 


Corrected Na on admission was ~170.


Now with nl BS measured serum Na values are accurate.


Was not improving on NS, switched back to D5W.


Current Na 166. Goal Na ~162 by tonight.


Decrease D5W to 100cc/hr.


2. TIGRE.


Due to volume depletion. 


Improving with IVR.


3. IDDM with hyperosmolar coma/ketoacidosis.


AG corrected. 


Creat back to 1.8.


Good uop.


On SQ insulin.























Plan:





02/26/18 10:55





02/26/18 10:57





02/26/18 10:58





Subjective: 





Patient not verbalizing this am. Switched NS back to D5W earlier this am.


Objective: 





 Vital Signs











Temp Pulse Resp BP Pulse Ox


 


 37.1 C   86   26 H  132/67 H  96 


 


 02/26/18 08:31  02/26/18 10:30  02/26/18 10:30  02/26/18 10:30  02/26/18 10:30








 Laboratory Results





 02/26/18 05:31 





 02/26/18 10:07 





 











 02/25/18 02/26/18 02/27/18





 05:59 05:59 05:59


 


Intake Total  5542 


 


Output Total  1405 


 


Balance  4137 








 











PT  14.8 SEC (12.0-15.0)   02/25/18  20:12    


 


INR  1.14  (0.83-1.16)   02/25/18  20:12    








Agitated, confused, in posey and soft wrist restraints, elderly male


IRIR, no m/g/r


CTAB


Abdom soft, nontender


No edema





Dilt gtt


D5W at 125cc/hr





ICD10 Worksheet


Patient Problems: 


 Problems











Problem Status Onset


 


Non-ketotic hyperosmolar coma Acute  


 


Hypothermia Acute  


 


Atrial fibrillation Acute

## 2018-02-26 NOTE — GCON
[f rep st]



                                                                    CONSULTATION





DATE OF CONSULTATION:  02/26/2018



REASON FOR CONSULTATION:  Opinion regarding acute kidney injury and worsening hypernatremia.



HISTORY OF PRESENT ILLNESS:  The patient is an 82-year-old gentleman that not much is known about his
 past medical history.  He resides in an independent living facility at [facility].  The patient had
 not been seen in 24 hours (since about supper the day prior).  He was found in his apartment and was
 quite confused.  They did a blood glucose check at the scene and that was not measurable.  He was br
ought to [facility] Emergency Department.  His initial laboratories at 8 o'clock on 02/25/2018
 showed a serum sodium of 152, potassium was 6.1, chloride 102, CO2 was 9, anion gap of 41, , 
creatinine 3.6, glucose 1023, measured serum osmolality 429.  By my calculation his serum osmolality 
was about 437.  Calcium 10, phosphorus 11.7, magnesium 3.9, total bilirubin 1.4.  Beta hydroxybutyrat
e of 9.35.  The patient was admitted to the intensive care unit, was initiated on normal saline and a
n insulin infusion.  2 hours later, measured serum sodium was 155, potassium 5.4, chloride 113, CO2 w
as 12, anion gap had decreased to 30, BUN was 100, creatinine decreased to 3.3, glucose had gone from
 1023 down to 859, calcium was 8.3.  By midnight, his serum sodium had increased to 160.  By 1:40 a.m
. up to 161 with a serum creatinine decreasing to 2.4, blood sugar 470. 



The patient is unable to his participate in an interview.



ALLERGIES:  Unknown.



HOME MEDICATIONS:  Include meclizine and Zofran.



FAMILY HISTORY, SOCIAL HISTORY, REVIEW OF SYSTEMS:  Unobtainable. 



He had 4.5 L in, about 900 cc of urine output.  We had initially switched him from normal saline to D
5W, his serum sodium was increased increasing, however, we will be switching that back to normal sali
ne.



PHYSICAL EXAMINATION:  VITAL SIGNS:  Blood pressure 104/57, pulse 96, respirations 26, temperature 35
.8.  GENERAL:  He is obtunded and not responsive.  HEENT:  Pupils do respond to light.  Mucous membra
ed are dry.  NECK:  No lymphadenopathy or JVD.  HEART:  Borderline tachycardic.  No rub.  LUNGS:  No
 rales, rhonchi, or wheezes.  ABDOMEN:  Flat, scaphoid, nontender, nondistended.  No obvious organome
latrice, masses, or bruits.  EXTREMITIES:  No edema, he does have changes of arterial insufficiency in h
is lower extremities bilaterally.  LYMPH:  No palpable lymphadenopathy or lymphedema.  MUSCULOSKELETA
L:  No effusions or tenderness.



LABORATORY:  Were reviewed as above.



IMPRESSION:  Hyperglycemia with hyperosmolality.  Due to the hyperglycemia, the patient has had large
 free water excretion leading to profound volume depletion and dehydration.  He has his initial serum
 sodium corrected for a glucose of 1030, corrects up to about 170.  Over the course of the past sever
al hours, as his blood sugar has come down and his serum sodium has come up, his serum osmolality has
 decreased, his corrected serum sodium has gone from 170 down to about 169.  We need to be very caref
ul with correcting his osmolality and serum sodiums too quickly leading to CNS fluid shifts.  We are 
going to back off on his insulin drip a bit and try to maintain his serum glucose levels about where 
they are.  We will be measuring serum osmolalities very frequently, will be checking serum sodium lev
els frequently as well.  We will try to not bring his serum sodium levels down by more than 8 in a 24
 hour period, we also need to be cognizant of his serum osmolality and free water deficit.  We will s
witch him from D5W to normal saline, with his osmolality at around 400 even normal saline is hypotoni
c with an osmolality of 308 per L.  I have discussed this with the primary physician on the case.  Al
l questions were answered to everyone's satisfaction.  We will continue to follow laboratory et ceter
a closely. 



Thank you for allowing me to participate in the care of your patient.  If there is any questions, ple
ase do not hesitate to contact us.  We will be following along with you.





Job #:  [id number]

## 2018-02-26 NOTE — PDMN
Medical Necessity


Medical necessity: est los>2mn for hyperglycemia, w/glucose>1000, HHS vs DKA, 

acute encephalopathy w/agitation, r/t hyperglycemia vs metabolic acidosis vs 

azotemia vs CVA, severe sepsis, and hypernatremia;  admit to ICU, insulin gtt, 

IVF, requiring 2 point restraints; medical hx unknown; per order and H&P 2/25/18

## 2018-02-26 NOTE — HOSPPROG
Hospitalist Progress Note


Assessment/Plan: 





82-year-old gentleman found confused at his independent living apartment with 

elevated blood sugars





# severe hypernatremia- sodium near 170 at presentation- correcting slowly today


   - cont hypotonic fluids per nephrology


   - cont serial labs





# severe Hyperglycemia-HHS versus DKA.  BS > 1000  - unclear trigger at this 

time- ruling out potential causes


   - cont insulin drip.  


   - Serial BMPs, VBG, lactic acid.  





#Acute encephalopathy - likely multifactorial with sodium near 170 and BS > 1000


   - follow with aggressive supportive care





# Severe sepsis-patient qualifies with leukocytosis, tachycardia, elevated 

lactate, acute renal failure - oxygen saturations 94% on 4L


   chest x-ray (personally reviewed and interpreted) without infiltrates. UA -

negative Blood cultures x2 are pending.  


   - agree with no empiric antibiotics





#Lactic acidosis - downtrending after IV fluids continue with aggressive 

hydration  





#Acute kidney injury - most likely prerenal in setting of severe dehydration.- 

creatinine 3.4 -> 1.8 with fluids


   - cont hydration and monitoring


  


# Atrial fibrillation (Acute) - Telemetry ( personally reviewed and interpreted

) normal sinus rhythm status post IV fluids and initiation of glucose 

correction.  


   - follow





# Hypothermia (Acute) - resolved status post bear hugger.  Patient has been 

able to maintain normalized temperature.





# Polycythemia - secondary to severe dehydration.  Currently corrected on 

repeat laboratory studies.  Continue IV fluids.





# Hyperkalemia - status post aggressive IV fluid hydration and now corrected.  

Patient does not need further treatment other than IV fluids.





# Microscopic hematuria - possibly related to traumatic Villalobos placement.  

Patient without any gross hematuria at this time.  





# severe protein calorie malnutrition -Under weight-patient's current weight is 

54.4 kg.  


   - dietary consult when alert


#FEN - IVF as noted above. electrolyte replacement if needed currently 

acceptable. Diet NPO. 


#PPX - SCDs. lovenox. 


#COR - FULL at this time.  Despite attempts for staff to locate any advanced 

directives as patient was on the independent living side these are not required 

to be filed.  Patient's listed contacts are thought to be .


Dispo - patient admitted to ICU.  He is critically ill requiring close 

monitoring.  Patient has been admitted to inpatient status and anticipate 

greater than 2 midnight stay.





I have discussed the case with Dr. Llamas will continue aggressive care plan


Subjective: no events this am


Objective: 


 Vital Signs











Temp Pulse Resp BP Pulse Ox


 


 37.4 C   77   25 H  70/38 L  94 


 


 18 14:14  18 17:40  18 17:40  18 17:40  18 17:40








 Laboratory Results





 18 05:31 





 18 16:20 





 











 18





 05:59 05:59 05:59


 


Intake Total  5542 1428


 


Output Total  1405 750


 


Balance  4137 678








 











PT  14.8 SEC (12.0-15.0)   18  20:12    


 


INR  1.14  (0.83-1.16)   18  20:12    














- Physical Exam


Constitutional: cachectic


Eyes: anicteric sclera


Ears, Nose, Mouth, Throat: dry mucous membranes


Cardiovascular: regular rate and rhythym, systolic murmur


Respiratory: no respiratory distress


Gastrointestinal: normoactive bowel sounds


Genitourinary: no bladder fullness


Skin: warm


Musculoskeletal: No asymmetric calves


Neurologic: No AAOx3


Psychiatric: No interacting appropriately


Lymph, Heme, Immunologic: no cervical LAD





ICD10 Worksheet


Patient Problems: 


 Problems











Problem Status Onset


 


Atrial fibrillation Acute  


 


Hypothermia Acute  


 


Non-ketotic hyperosmolar coma Acute

## 2018-02-26 NOTE — ECHO
[email or web address]





[address] [address] 

Main: [phone] 



Fax: 



Transthoracic Echocardiogram 

Name:             SEBASTIAN ALVA                        MR#:

[id number]

Study Date:       2018                            Study Time:

08:06 AM

YOB: 1935                            Age:

82 year(s)

Height:           177.8 cm (70 in.)                     Weight:

55.34 kg (122 lb.)

BSA:              1.69 m2                               Gender:

Male

Examination:      Echo                                  Indication:

Atrial Fibrillation, Confusion

Image Quality:                                          Contrast: 

Requested by:     Sharla Salguero                          BP:

91 mmHg/51 mmHg

Heart Rate:                                             Rhythm:

Atrial fibrillation

Indication:       Atrial Fibrillation, Confusion 



Procedure Staff 

Ultrasound Technician:   Chencho Mccarty RDCS 

Reading Physician:       John Sheikh MD 

Requesting Provider: 



Conclusions:          Normal size left ventricle.  

No LV hypertrophy.  

Normal global systolic LV function.  

EF is 58 %.  

No regional wall motion abnormality.  

Diastolic dysfunction is present. .  

Normal RV function.  

The mitral valve is normal in appearance and function.  

There is no mitral valve regurgitation.  

The aortic valve is tri-leaflet and functions normally.  

There is no aortic valve regurgitation.  

Trivial tricuspid valve regurgitation.  

The pulmonic valve is normal in appearance and function. 



Measurements: 

Chambers                    Valvular Assessment AV/MV

Valvular Assessment TV/PV



Normal                                   Normal

Normal

Name         Value     Range              Name         Value Range

Name           Value Range

Ao Lin (MM): 3.0 cm    (2.2 cm-3.7            AV Vmax:     1.40 m/s (1

m/s-1.7       PV Vmax:       0.98 m/s (0.6 m/s-0.9



cm)                                  m/s)

m/s)

IVSd (2D):   0.9 cm (0.6 cm-1.1               AV maxP mmHg ( -

)          PV PGmax:      4  mmHg ( - )



cm)                LVOT Vmax:   0.82 m/s (0.7 m/s-1.1 

LVDd (2D):   3.8 cm    (4.2 cm-5.9                              m/s)  



cm)                MV E Vmax:   0.38 m/s ( - )  

LVDs (2D):   2.7 cm    (2.1 cm-4              MV A Vmax:   0.88 m/s (

- )



cm)                MV E/A:      0.43 ( - )  

LVPWd (2D):  0.9 cm    (0.6 cm-1 



cm)   

LVEF (2D):   58        (>=54 %)   



Patient: SEBASTIAN ALVA                      MRN: [id number]

Study Date: 2018   Page 1 of 2

08:06 AM 









Continued Measurements: 

Chambers                      Valvular Assessment AV/MV  



Name                       Value          Name

Value

LADs Lon.4 cm               MV E/E' Septal:

6.70

LA Area:                 14.3 cm2         MV E/E' Lateral:

7.00

LA Volume:               38 ml   

LA Volume Index:         22.5 ml/m2   



Findings:             Left Ventricle: 

Normal size left ventricle. No LV hypertrophy. Normal global systolic

LV function. EF is 58 %. No

regional wall motion abnormality. Diastolic dysfunction is present. .



Right Ventricle: 

Normal size right ventricle. Normal RV function.  

Left Atrium: 

The left atrium is normal in size.  

Right Atrium: 

The right atrium is normal in size.  

Mitral Valve: 

The mitral valve is normal in appearance and function. There is no

mitral valve regurgitation.

Aortic Valve: 

The aortic valve is tri-leaflet and functions normally. There is no

aortic valve regurgitation.

Tricuspid Valve: 

The tricuspid valve appears normal. Trivial tricuspid valve

regurgitation.

Pulmonic Valve: 

The pulmonic valve is normal in appearance and function.  

Aorta: 

The aorta is normal.  

Pericardium: 

No pericardial effusion.  

Exam Comments: 

The rhythm is atrial fibrillation.. 







Electronically signed by John Sheikh MD on 2018 at 10:51 AM 

(No Signature Object) 



Patient: SEBASTIAN ALVA                      MRN: [id number]

Study Date: 2018   Page 2 of 2

08:06 AM 







[id number]

## 2018-02-27 RX ADMIN — INSULIN LISPRO SCH UNIT: 100 INJECTION, SOLUTION INTRAVENOUS; SUBCUTANEOUS at 22:03

## 2018-02-27 RX ADMIN — SODIUM CHLORIDE SCH MLS: 450 INJECTION, SOLUTION INTRAVENOUS at 14:57

## 2018-02-27 RX ADMIN — DEXMEDETOMIDINE HYDROCHLORIDE SCH MLS: 4 INJECTION, SOLUTION INTRAVENOUS at 21:20

## 2018-02-27 RX ADMIN — HEPARIN SODIUM SCH UNIT: 5000 INJECTION, SOLUTION INTRAVENOUS; SUBCUTANEOUS at 05:17

## 2018-02-27 RX ADMIN — DEXMEDETOMIDINE HYDROCHLORIDE SCH MLS: 4 INJECTION, SOLUTION INTRAVENOUS at 13:20

## 2018-02-27 RX ADMIN — HEPARIN SODIUM SCH UNIT: 5000 INJECTION, SOLUTION INTRAVENOUS; SUBCUTANEOUS at 21:51

## 2018-02-27 RX ADMIN — INSULIN LISPRO SCH UNIT: 100 INJECTION, SOLUTION INTRAVENOUS; SUBCUTANEOUS at 05:18

## 2018-02-27 RX ADMIN — SODIUM CHLORIDE SCH MLS: 450 INJECTION, SOLUTION INTRAVENOUS at 10:02

## 2018-02-27 RX ADMIN — HEPARIN SODIUM SCH UNIT: 5000 INJECTION, SOLUTION INTRAVENOUS; SUBCUTANEOUS at 15:00

## 2018-02-27 RX ADMIN — INSULIN LISPRO SCH: 100 INJECTION, SOLUTION INTRAVENOUS; SUBCUTANEOUS at 18:02

## 2018-02-27 RX ADMIN — DEXMEDETOMIDINE HYDROCHLORIDE SCH MLS: 4 INJECTION, SOLUTION INTRAVENOUS at 05:45

## 2018-02-27 RX ADMIN — DEXMEDETOMIDINE HYDROCHLORIDE SCH MLS: 4 INJECTION, SOLUTION INTRAVENOUS at 16:01

## 2018-02-27 RX ADMIN — DEXTROSE MONOHYDRATE SCH MLS: 5 INJECTION, SOLUTION INTRAVENOUS at 18:26

## 2018-02-27 RX ADMIN — INSULIN LISPRO SCH UNIT: 100 INJECTION, SOLUTION INTRAVENOUS; SUBCUTANEOUS at 11:10

## 2018-02-27 NOTE — HOSPPROG
Hospitalist Progress Note


Assessment/Plan: 





82-year-old gentleman found confused at his independent living apartment with 

elevated blood sugars





# severe hypernatremia- sodium near 170 -> 162 this a.m.


   - cont hypotonic fluids per nephrology


   - cont serial Q4 labs





# severe Hyperglycemia-HHS versus DKA.  BS > 1000  - unclear trigger at this 

time- ruling out potential causes


   - transitioned from insulin drip to high-dose sliding scale Q 6


   - Serial BMPs, VBG, lactic acid.  





#Acute encephalopathy - likely multifactorial with sodium near 170 and BS > 1000


   - follow with aggressive supportive care





# Severe sepsis-patient qualifies with leukocytosis, tachycardia, elevated 

lactate, acute renal failure - oxygen saturations 94% on 4L


   chest x-ray (personally reviewed and interpreted) without infiltrates. UA -

negative Blood cultures x2 are pending.  


   - agree with no empiric antibiotics





#Lactic acidosis -  continue with aggressive hydration  





#Acute kidney injury - most likely prerenal in setting of severe dehydration.- 

creatinine 3.4 -> 1.6 with fluids


   - cont hydration and monitoring


  


# Atrial fibrillation (Acute) - Telemetry ( personally reviewed and interpreted

) normal sinus rhythm status post IV fluids and initiation of glucose 

correction.  


   - follow





# Hypothermia (Acute) - resolved status post bear hugger.  Patient has been 

able to maintain normalized temperature.





# Polycythemia - secondary to severe dehydration.  Currently corrected on 

repeat laboratory studies.  Continue IV fluids.





# Hyperkalemia - status post aggressive IV fluid hydration and now corrected.  

Patient does not need further treatment other than IV fluids.





# Microscopic hematuria - possibly related to traumatic Villalobos placement.  

Patient without any gross hematuria at this time.  





# severe protein calorie malnutrition -Under weight-patient's current weight is 

54.4 kg.  


   - dietary consult when alert





#FEN - Diet NPO. 


#PPX - heparin subcu


#COR - FULL at this time.  


Dispo - patient admitted to ICU.  He is critically ill requiring close 

monitoring.  Patient has been admitted to inpatient status and anticipate 

greater than 2 midnight stay.





I have discussed the case with Dr. Llamas will continue aggressive care plan and 

focus correction of patient's electronic abnormality


Subjective: Requires Precedex secondary to agitation


Objective: 


 Vital Signs











Temp Pulse Resp BP Pulse Ox


 


 37.2 C   81   32 H  118/58 L  94 


 


 02/27/18 08:00  02/27/18 14:00  02/27/18 14:00  02/27/18 14:00  02/27/18 14:00








 Laboratory Results





 02/26/18 05:31 





 02/27/18 04:00 





 











 02/26/18 02/27/18 02/28/18





 05:59 05:59 05:59


 


Intake Total 5542 2498 


 


Output Total 1405 1375 325


 


Balance 4137 1123 -325








 











PT  14.8 SEC (12.0-15.0)   02/25/18  20:12    


 


INR  1.14  (0.83-1.16)   02/25/18  20:12    














- Physical Exam


Constitutional: cachectic


Eyes: anicteric sclera


Ears, Nose, Mouth, Throat: dry mucous membranes


Cardiovascular: regular rate and rhythym


Respiratory: no respiratory distress


Gastrointestinal: normoactive bowel sounds


Genitourinary: no bladder fullness


Skin: warm


Musculoskeletal: No asymmetric calves


Neurologic: No AAOx3


Psychiatric: No interacting appropriately, No agitated


Lymph, Heme, Immunologic: no cervical LAD





ICD10 Worksheet


Patient Problems: 


 Problems











Problem Status Onset


 


Atrial fibrillation Acute  


 


Hypothermia Acute  


 


Non-ketotic hyperosmolar coma Acute

## 2018-02-27 NOTE — SOAPPROG
SOAP Progress Note


Assessment/Plan: 


Assessment:


TIGRE (non oliguric) creat better with improved volume and blood pressure


hyperosmolality better overall


hyperglycemia, treating, off D5W now and on hypotonic saline solution


hypotension, BP  range


hypernatremia, no significant improvement the past 12 hours or so


acidosis due to TIGRE 


























Plan:


changed IVF, continue to follow Na every 4 hours


continue supportive therapies


no dialysis needs


follow CO2


02/27/18 12:48





Objective: 





 Vital Signs











Temp Pulse Resp BP Pulse Ox


 


 37.2 C   77   34 H  115/55 L  94 


 


 02/27/18 08:00  02/27/18 12:00  02/27/18 12:00  02/27/18 12:00  02/27/18 12:00








 Laboratory Results





 02/26/18 05:31 





 02/27/18 04:00 





 











 02/26/18 02/27/18 02/28/18





 05:59 05:59 05:59


 


Intake Total 5542 2498 


 


Output Total 1405 1375 325


 


Balance 4137 1123 -325








 











PT  14.8 SEC (12.0-15.0)   02/25/18  20:12    


 


INR  1.14  (0.83-1.16)   02/25/18  20:12    














Physical Exam





- Physical Exam


General Appearance: other (no response to his name)


Respiratory: other (bilateral upperairway noises, no distinct rh or wh, moving 

about and moaning while I was trying to listen)


Cardiac/Chest: regular rate, rhythm, No edema, No friction rub


Abdomen: normal bowel sounds, non-tender, soft


Skin: warm/dry


Extremities: non-tender, No pedal edema


Neuro/Psych: other (minimally arouseable, moaning)





ICD10 Worksheet


Patient Problems: 


 Problems











Problem Status Onset


 


Atrial fibrillation Acute  


 


Hypothermia Acute  


 


Non-ketotic hyperosmolar coma Acute

## 2018-02-27 NOTE — ASMTCMCOM
CM Note

 

CM Note                       

Notes:

Patient continues to be confused, labs unchanged, non verbal, at times 

contracted, restrained. Patient had [facility] assisting him at home. Patient has not been 

able to work with Therapies given his confusion. May need SNF on discharge. CM to follow.

 

Date Signed:  02/27/2018 03:13 PM

Electronically Signed By:Harika Ho LCSW

## 2018-02-28 LAB — PLATELET # BLD: 99 10^3/UL (ref 150–400)

## 2018-02-28 RX ADMIN — ACETAMINOPHEN PRN MG: 650 SUPPOSITORY RECTAL at 03:31

## 2018-02-28 RX ADMIN — SODIUM CHLORIDE SCH MLS: 900 INJECTION, SOLUTION INTRAVENOUS at 12:24

## 2018-02-28 RX ADMIN — DEXMEDETOMIDINE HYDROCHLORIDE SCH MLS: 4 INJECTION, SOLUTION INTRAVENOUS at 21:20

## 2018-02-28 RX ADMIN — INSULIN LISPRO SCH UNIT: 100 INJECTION, SOLUTION INTRAVENOUS; SUBCUTANEOUS at 06:31

## 2018-02-28 RX ADMIN — INSULIN LISPRO SCH: 100 INJECTION, SOLUTION INTRAVENOUS; SUBCUTANEOUS at 18:43

## 2018-02-28 RX ADMIN — ACETAMINOPHEN PRN MG: 650 SUPPOSITORY RECTAL at 21:08

## 2018-02-28 RX ADMIN — OSELTAMIVIR PHOSPHATE SCH: 6 POWDER, FOR SUSPENSION ORAL at 14:47

## 2018-02-28 RX ADMIN — DEXMEDETOMIDINE HYDROCHLORIDE SCH MLS: 4 INJECTION, SOLUTION INTRAVENOUS at 07:35

## 2018-02-28 RX ADMIN — Medication SCH MLS: at 23:00

## 2018-02-28 RX ADMIN — DEXMEDETOMIDINE HYDROCHLORIDE SCH MLS: 4 INJECTION, SOLUTION INTRAVENOUS at 03:02

## 2018-02-28 RX ADMIN — POTASSIUM CHLORIDE SCH MLS: 400 INJECTION, SOLUTION INTRAVENOUS at 03:02

## 2018-02-28 RX ADMIN — NOREPINEPHRINE BITARTRATE SCH MLS: 1 INJECTION, SOLUTION, CONCENTRATE INTRAVENOUS at 20:57

## 2018-02-28 RX ADMIN — DEXTROSE MONOHYDRATE SCH MLS: 5 INJECTION, SOLUTION INTRAVENOUS at 06:31

## 2018-02-28 RX ADMIN — POTASSIUM CHLORIDE SCH MLS: 400 INJECTION, SOLUTION INTRAVENOUS at 01:59

## 2018-02-28 NOTE — HOSPPROG
Hospitalist Progress Note


Assessment/Plan: 


33 min of critical care time spent with patient, at bedside, coordinating care 

with nurse, addressing the following issues:





-contacted for hypoglycemia and hypotension, went to evaluate patient at bedside


-physical exam demonstrates no expiratory wheezes, poor inspiratory air 

movement with tachypnea on BiPAP, alert awake oriented times 0, patient is not 

directable and does not follow commands, heart rhythm is regular, he is not 

tachycardic, does not demonstrate lower extremity edema


-patient's systolic blood pressure was 87 with a CVP of 7, and elevated lactic 

acid level and no demonstrable fluid responsiveness on NICOM


-the patient most likely has septic shock evidenced by end-organ failure (

respiratory failure, renal failure, lactic acidosis, encephalopathy, and 

hypotension) 2/2 influenza, and he should be initiated on pressors


-Levophed was initiated with good response, resultant lactic acid level down 

trended 2.3


-Echo results reviewed, demonstrated preserved EF





-glucose level was around 60, insulin drip which is being used for 

hyperglycemia was discontinued, and patient was continued on D5W at 25 cc an 

hour per Renal instructions


-potassium was 3.4, Cr 1.6, and 20mEq of IV potassium was administered, BMP was 

rechecked and potassium improved to 3.7, creatinine improved to 1.4 after 

stabilizing blood pressure


-chest x-ray was reviewed which did demonstrate left lower lobe infiltrate but 

no evidence of florid CHF or diffuse airspace disease


-patient notably has positive influenza but is unable to take Tamiflu secondary 

to his inability to engage in safe swallow, does not have OG tube


-the patient has not been evaluated for possible venous thromboembolism, D-

dimer will be sent, and if markedly elevated, I have discussed with Dr. Salguero 

and she will consider CTA, given that it is unclear what patient's respiratory 

status is so poor and has continued to decline despite his viral pneumonia 

being fairly mild on chest x-ray, and him not demonstrating any evidence of 

reactive airway disease





Patient remains critically ill with high risk of worsening morbidity and/or 

mortality secondary to septic shock and respiratory failure as outlined above.





Objective: 


 Vital Signs











Temp Pulse Resp BP Pulse Ox


 


 38.3 C   88   25 H  106/51 L  94 


 


 02/28/18 22:00  02/28/18 22:00  02/28/18 22:00  02/28/18 22:00  02/28/18 22:00








 Microbiology











 02/28/18 06:20 Respiratory Panel (PCR) - Final





 Nasal, Sinus - Swab    Influenza Virus Type A H3








 Laboratory Results





 02/28/18 04:15 





 02/28/18 21:26 





 











 02/27/18 02/28/18 03/01/18





 05:59 05:59 05:59


 


Intake Total 2498 3208 1475


 


Output Total 1375 4375 740


 


Balance 1123 -1167 735








 











PT  14.8 SEC (12.0-15.0)   02/25/18  20:12    


 


INR  1.14  (0.83-1.16)   02/25/18  20:12    














ICD10 Worksheet


Patient Problems: 


 Problems











Problem Status Onset


 


Atrial fibrillation Acute  


 


Hypothermia Acute  


 


Non-ketotic hyperosmolar coma Acute

## 2018-02-28 NOTE — WOCRNPDOC
ELENA Advanced Assessment Note





- Skin Integrity Problem, Advanced Assess


  ** Forehead Pressure Injury


Dressing Type: Open to Air


Integumentary Issue Intervention: Dressing Applied


Site Measurement - Head-to-Toe Length X Width X Depth (cm): 1.3x2x0 (x2) areas


Pressure Injury Stage: Stage 1, Medical Device Related Pressure Injury (bipap 

mask)


Pressure Injury Present on Admit: No


Skin Integrity Problem Comment: Wound RN consulted to assist with pressure 

injury prevention from BIPAP mask. Patient been on Bipap for 7+ hours. Mepilex 

6x6 cut into 1/4 and placed under mask pressure area on forehead. Several other 

pieces of mepilex border were cut and labeled for each area for further use.





  ** Nose Pressure Injury


Dressing Type: Allevyn Life


Dressing Description: Clean/Dry, Intact


Integumentary Issue Intervention: Dressing Changed


Bebe Wound Tissue: Blanching, Erythema


Site Measurement - Head-to-Toe Length X Width X Depth (cm): 2x4x0


Pressure Injury Stage: Stage 1, Medical Device Related Pressure Injury


Pressure Injury Present on Admit: No


Skin Integrity Problem Comment: Mepilex border sacrum cut to fit; coccyx area 

used for nose, sacral area used for chin/lower lip.





  ** Lower Lip Pressure Injury


Dressing Type: Allevyn Life


Dressing Description: Clean/Dry, Intact


Integumentary Issue Intervention: Dressing Changed


Site Measurement - Head-to-Toe Length X Width X Depth (cm): 2x3x0


Pressure Injury Stage: Stage 1, Medical Device Related Pressure Injury


Pressure Injury Present on Admit: No


Skin Integrity Problem Comment: Mepilex sacrum border cut to fit: sacrum 

portion for lower lip/chin, coccyx portion for nose.

## 2018-02-28 NOTE — ASMTCMCOM
CM Note

 

CM Note                       

Notes:

Spoke with Lenora at [facility] - patient is currently open with PT/OT/RN/CNA 

services. They will need orders to resume care.



Patient remains very ill with electrolyte imbalances, increased respiratory needs, and a positive 

Influenza A test. He has not been out of bed. We will order therapies when appropriate. 

 

Date Signed:  02/28/2018 01:18 PM

Electronically Signed By:Sarah Beasley RN

## 2018-02-28 NOTE — GCON
[f rep st]



                                                                    CONSULTATION





PULMONARY/CRITICAL CARE CONSULTATION



DATE OF CONSULTATION:  02/28/2018



REFERRING PHYSICIAN:  Annalise Guerra MD





REASON FOR REFERRAL:  Evaluation and management of hypoxemia and hypotension.



HISTORY:  The patient is an 82-year-old gentleman who apparently lives at independent living and does
 not usually have close contact with others. He apparently had not been seen in at least 24 hours. A 
welfare check was done and he was found confused in his apartment. He was brought to the emergency de
partment, where he was found to have a blood sugar of over 1000 and a sodium level of 152. His actual
 sodium was probably closer to 170, when correcting for his glucose. He has remained confused since h
ospitalization. He has been seen by Nephrology who has been treating his hypernatremia, which peaked 
at 167 on the morning after admission. His hyperglycemia is also being treated with insulin and D5. H
is sodium was steadily correcting and his blood sugars were coming under control, although he remaine
d confused. Last night he developed marked increase in his oxygen needs. His I's and O's were positiv
e, and a dose of Lasix was given. Subsequently, he developed hypotension and an influenza swab was po
sitive. Today, he has been given 1.5 L of additional fluid boluses and has been placed on BiPAP due t
o hypoxemia and increased respiratory effort. He remained confused.



PAST MEDICAL HISTORY:  None.



MEDICATIONS:  At time of admission apparently include Zofran, meclizine, Remeron, Zestril, and Zocor.




ALLERGIES:  None.



SOCIAL HISTORY:  The patient lives independently at [facility]. It is unknown if he has ever smoked.
 He has no known history of excessive alcohol intake.



FAMILY HISTORY:  Unobtainable.



REVIEW OF SYSTEMS:  Unobtainable.



PHYSICAL EXAMINATION:  GENERAL: The patient is somnolent but arousable and remains confused and somew
hat agitated. VITAL SIGNS: Blood pressure is 91/48, and the heart rate is 80. His temperature was 39.
6 last night, but he is currently afebrile. Oxygen saturations are 97% on 80% oxygen via BiPAP. HEENT
: Normocephalic and atraumatic. No icterus. NECK: No JVD. Trachea is midline. CHEST: He has some rale
s bilaterally. CARDIAC: Regular rate and rhythm without murmur. ABDOMEN: Soft, nontender. Bowel sound
s are present. EXTREMITIES: No clubbing, cyanosis, or edema. NEURO: The patient is somnolent but arou
sable and is somewhat agitated/combative. He moves all 4 extremities.



LABORATORY:  Sodium is 153, chloride is 119, potassium is 3.9. A creatinine is 1.9, down from 3.6 at 
admission, but up from 1.3 yesterday. Glucose is 385, calcium is 7.6, a hemoglobin is 12.6, a white b
lood count is 5.4. Arterial blood gas shows a pH of 7.48, with a pO2 of 79, CO2 of 22, and a bicarbon
ate of 17, on BiPAP with 100% oxygen. Lactate was 3.2 on February 26.



IMAGING:  A chest x-ray shows some basilar atelectasis/infiltrates. Images reviewed by me.



ASSESSMENT:  

1.  Hyperglycemia. The patient has no known prior history of diabetes. His blood sugar was 1000 when 
he came in and he had ketoacidosis with an elevated beta hydroxybutyrate. His blood sugars have impro
ana luisa, but have increased a bit over the last 24 hours. His anion gap has normalized. He is on an insul
in drip.

2.  Hypernatremia. This is likely due to dehydration. His sodium is correcting steadily and is being 
closely followed by Nephrology.

3.  Influenza pneumonia. This may have contributed to his initial presentation. It also could be cont
ributing to his worsening respiratory status with hypoxemia.

4.  Hypertension. This has worsened overnight. He could be developing acute respiratory distress synd
mahesh, systemic inflammatory response syndrome or sepsis-like syndrome related to influenza. His blood
 pressure and urine output have responded to IV fluids.



RECOMMENDATIONS:  

1.  Continue with boluses of IV fluids as needed. Currently, the NICOM monitor shows that he is not f
luid responsive. Pressors will be started if he is no longer responding to fluids, but remains hypote
nsive.

2.  Start Tamiflu.

3.  Continue hypotonic fluids cautiously to gradually reduce his sodium level.

4.  Continue insulin drip.





Job #:  [id number]

## 2018-02-28 NOTE — SOAPPROG
SOAP Progress Note


Assessment/Plan: 


Assessment:








TIGRE- non-oliguric


-Cr peak mid 3, was improving to 1.3 yesterday with IVF, now up to 2.1 on last 

check likely due to lasix/hemodynamics (BP in 90s)


-continue hypotonic fluids, evaluating for infectious causes as at risk for 

aspiration/secondary pna with influenza- low threshold to start pressors


-would hold on further lasix today as suspect hypoxia more infectious





#Hypernatremia


-continues on d5W @ 100 cc/hr-- discussed with hospitalist and given agitation, 

doubt would tolerate DHT for po water at this point, thus will continue D5W and 

insulin gtt for now


-recent labs a bit discordant as one was I-stat and one was lab value-- will 

recheck now and reassess. adjust fluids prn





#Hypokalemia


-lasix likely contributing as well as shifting from hyperglycemia


-repleted K overnight, follow labs





#DM with DKA


-restarting insulin gtt





#sepsis, encephalopathy-- febrile overnight


-rapid flu positive


-ongoing cultures as at risk for secondary infection-- defer antibiotics to 

hospitalist





I discussed with Dr. Charlie Clemente MD


[facility]


[phone] pager





02/28/18 09:42





Subjective: 





More sob, confused overnight- on BIPAP, given dose lasix. KCL replaced 40meq 

IV. Continues on D5W @ 100 cc/hour. Rapid flu positive.


Objective: 





 Vital Signs











Temp Pulse Resp BP Pulse Ox


 


 38.8 C H  97   30 H  93/43 L  97 


 


 02/28/18 07:00  02/28/18 07:00  02/28/18 07:00  02/28/18 07:00  02/28/18 07:00








 Laboratory Results





 02/28/18 04:15 





 02/27/18 20:00 





 











 02/27/18 02/28/18 03/01/18





 05:59 05:59 05:59


 


Intake Total 2498 3208 


 


Output Total 1375 4375 


 


Balance 1123 -1167 








 











PT  14.8 SEC (12.0-15.0)   02/25/18  20:12    


 


INR  1.14  (0.83-1.16)   02/25/18  20:12    














Physical Exam





- Physical Exam


General Appearance: other (confused, restless, on BIPAP)


Respiratory: other (coarse bs)


Cardiac/Chest: regular rate, rhythm


Abdomen: non-tender, soft


Skin: warm/dry


Extremities: other (no edema)


Neuro/Psych: other (confused, agitated)





ICD10 Worksheet


Patient Problems: 


 Problems











Problem Status Onset


 


Atrial fibrillation Acute  


 


Hypothermia Acute  


 


Non-ketotic hyperosmolar coma Acute

## 2018-03-01 RX ADMIN — OSELTAMIVIR PHOSPHATE SCH: 6 POWDER, FOR SUSPENSION ORAL at 12:10

## 2018-03-01 RX ADMIN — POTASSIUM CHLORIDE SCH MLS: 400 INJECTION, SOLUTION INTRAVENOUS at 12:07

## 2018-03-01 RX ADMIN — DEXMEDETOMIDINE HYDROCHLORIDE SCH MLS: 4 INJECTION, SOLUTION INTRAVENOUS at 22:16

## 2018-03-01 RX ADMIN — INSULIN LISPRO SCH UNIT: 100 INJECTION, SOLUTION INTRAVENOUS; SUBCUTANEOUS at 18:41

## 2018-03-01 RX ADMIN — DEXTROSE MONOHYDRATE SCH MLS: 5 INJECTION, SOLUTION INTRAVENOUS at 21:17

## 2018-03-01 RX ADMIN — NOREPINEPHRINE BITARTRATE SCH MLS: 1 INJECTION, SOLUTION, CONCENTRATE INTRAVENOUS at 21:17

## 2018-03-01 RX ADMIN — OSELTAMIVIR PHOSPHATE SCH: 6 POWDER, FOR SUSPENSION ORAL at 20:09

## 2018-03-01 RX ADMIN — DEXMEDETOMIDINE HYDROCHLORIDE SCH MLS: 4 INJECTION, SOLUTION INTRAVENOUS at 17:34

## 2018-03-01 RX ADMIN — DEXMEDETOMIDINE HYDROCHLORIDE SCH MLS: 4 INJECTION, SOLUTION INTRAVENOUS at 01:39

## 2018-03-01 RX ADMIN — DEXTROSE MONOHYDRATE SCH MLS: 5 INJECTION, SOLUTION INTRAVENOUS at 10:37

## 2018-03-01 RX ADMIN — INSULIN LISPRO SCH UNIT: 100 INJECTION, SOLUTION INTRAVENOUS; SUBCUTANEOUS at 23:18

## 2018-03-01 RX ADMIN — DEXTROSE MONOHYDRATE SCH MLS: 5 INJECTION, SOLUTION INTRAVENOUS at 06:37

## 2018-03-01 RX ADMIN — ACETAMINOPHEN PRN MG: 650 SUPPOSITORY RECTAL at 23:21

## 2018-03-01 RX ADMIN — ACETAMINOPHEN PRN MG: 650 SUPPOSITORY RECTAL at 13:39

## 2018-03-01 RX ADMIN — DEXMEDETOMIDINE HYDROCHLORIDE SCH MLS: 4 INJECTION, SOLUTION INTRAVENOUS at 12:38

## 2018-03-01 RX ADMIN — Medication SCH MLS: at 21:17

## 2018-03-01 RX ADMIN — DEXMEDETOMIDINE HYDROCHLORIDE SCH MLS: 4 INJECTION, SOLUTION INTRAVENOUS at 06:36

## 2018-03-01 RX ADMIN — POTASSIUM CHLORIDE SCH MLS: 400 INJECTION, SOLUTION INTRAVENOUS at 13:13

## 2018-03-01 RX ADMIN — SODIUM CHLORIDE SCH MLS: 900 INJECTION, SOLUTION INTRAVENOUS at 11:28

## 2018-03-01 NOTE — HOSPPROG
Hospitalist Progress Note


Assessment/Plan: 





82-year-old gentleman found confused at his independent living apartment with 

elevated blood sugars





# acute hypoxic respiratory failure-patient with a dramatic decompensation now 

requiring BiPAP


   Chest x-ray(personally reviewed and interpreted) without significant 

infiltrates consistent hypoventilation peribronchial inflammation


   - patient started on BiPAP overnight


   - respiratory swab positive for influenza A


   - continue aggressive pulmonary support- will transition to vapotherm 2/2 

skin breakdown





# influenza a presumed acute- will start Tamiflu if patient able to swallow





# Severe sepsis w/ acute hypotension( leukocytosis, tachycardia, elevated 

lactate, acute renal failure ) - bcx NGTD x 2 sets


   Suspecting sepsis physiology- started on pressors after fluid bolusing


   - cont pressor support


   - cont IVF





# severe hypernatremia- sodium near 170 -> 155 this a.m.


   - cont hypotonic fluids per nephrology


   - cont serial Q4 labs





# severe Hyperglycemia- HHS versus DKA.  BS > 1000  - suspect trigger likely 

newly diagnosed influenza A


   - transitioning back insulin SSI 2/2 hypoglycemia 





#Acute encephalopathy - likely multifactorial with sodium near 170 and BS > 1000


   - follow with aggressive supportive care


   - if persists consider brain imaging





#Acute kidney injury - most likely prerenal in setting of severe dehydration.- 

creatinine 3.4 -> 2.2 -> 1.2


   - cont hydration and monitoring


  


# Atrial fibrillation (Acute) - Telemetry ( personally reviewed and interpreted

) normal sinus rhythm status post IV fluids and initiation of glucose 

correction.  


   - follow





# severe protein calorie malnutrition -Under weight-patient's current weight is 

54.4 kg.  


   - dietary consult when alert


   - consider TF





#FEN - Diet NPO. 


#PPX - heparin subcu


#COR - FULL at this time.  


Dispo - patient admitted to ICU.  He is critically ill requiring close 

monitoring.  Patient has been admitted to inpatient status and anticipate 

greater than 2 midnight stay.





I have discussed the case with Dr. Llamas will not pursue CTA today...cont to 

monitor


Subjective: remains critical


Objective: 


 Vital Signs











Temp Pulse Resp BP Pulse Ox


 


 38.2 C   81   25 H  100/55 L  92 


 


 03/01/18 18:00  03/01/18 18:00  03/01/18 18:00  03/01/18 18:00  03/01/18 18:00








 Laboratory Results





 02/28/18 04:15 





 03/01/18 18:20 





 











 02/28/18 03/01/18 03/02/18





 05:59 05:59 05:59


 


Intake Total 3208 2136 1253


 


Output Total 4375 1740 1160


 


Balance -1167 396 93








 











PT  14.8 SEC (12.0-15.0)   02/25/18  20:12    


 


INR  1.14  (0.83-1.16)   02/25/18  20:12    














- Physical Exam


Constitutional: chronically ill appearing


Eyes: anicteric sclera


Ears, Nose, Mouth, Throat: dry mucous membranes


Cardiovascular: regular rate and rhythym


Respiratory: rhonchi


Gastrointestinal: normoactive bowel sounds


Genitourinary: no bladder fullness


Skin: warm


Musculoskeletal: No asymmetric calves


Neurologic: No AAOx3


Psychiatric: agitated


Lymph, Heme, Immunologic: no cervical LAD





ICD10 Worksheet


Patient Problems: 


 Problems











Problem Status Onset


 


Atrial fibrillation Acute  


 


Hypothermia Acute  


 


Non-ketotic hyperosmolar coma Acute

## 2018-03-01 NOTE — PDINTPN
Intensivist Progress Note


Assessment/Plan: 


Assessment:


Hypernatremia: Improving steadily, now 155. On D5, followed by Nephrology. 


Hyperglycemia: Improved, not normalized. On insulin gtt.


Influenza: Likely contributes to hypoxemia, delirium


Elevated d-dimer: At risk for PE, which could explain his hypoxemia/hypotension

, although there are several other possible explanations. CTA  poses a bit of 

risk for TIGRE given recently elevated Cr.


Hypoxemic respiratory failure: Multifactorial, with fluid shifts, Influenza, 

atelectasis, possible aspiration. On BiPAP, getting pressures sores from mask.


Hemodynamics: Hypotension improved, on low-dose NE. HR down today.





Plan: Change to Vapotherm. US BLEs to look for DVT. If negative, consider CTA 

in the next day or two if Cr remains OK. Increase Tamiflu dose for improved 

renal function. Wean sedation and pressors as tolerated.





03/01/18 11:16





03/01/18 11:19





Subjective: 





Agitated, unresponsive 


Objective: 





 Vital Signs











Temp Pulse Resp BP Pulse Ox


 


 38.2 C   79   23 H  119/54 L  97 


 


 03/01/18 10:00  03/01/18 10:00  03/01/18 10:00  03/01/18 10:00  03/01/18 10:00








 Microbiology











 02/28/18 06:20 Respiratory Panel (PCR) - Final





 Nasal, Sinus - Swab    Influenza Virus Type A H3








 Laboratory Results





 02/28/18 04:15 





 03/01/18 09:24 





 











 02/28/18 03/01/18 03/02/18





 05:59 05:59 05:59


 


Intake Total 3208 2136 


 


Output Total 4375 1740 


 


Balance -1167 396 








 











PT  14.8 SEC (12.0-15.0)   02/25/18  20:12    


 


INR  1.14  (0.83-1.16)   02/25/18  20:12    











 Laboratory Tests











  02/28/18





  23:12


 


D-Dimer  3.22 H














Physical Exam





- Physical Exam


General Appearance: alert, no apparent distress


EENT: normal ENT inspection


Neck: normal inspection


Respiratory: lungs clear, normal breath sounds


Cardiac/Chest: regular rate, rhythm, No edema


Abdomen: normal bowel sounds, non-tender, soft


Skin: normal color, warm/dry


Extremities: normal inspection


Neuro/Psych: disoriented to person, disoriented to place, disoriented to time, 

No alert, No motor weakness





ICD10 Worksheet


Patient Problems: 


 Problems











Problem Status Onset


 


Atrial fibrillation Acute  


 


Hypothermia Acute  


 


Non-ketotic hyperosmolar coma Acute

## 2018-03-01 NOTE — ASMTCMCOM
CM Note

 

CM Note                       

Notes:

Patient is improving slowly. Increase tamiflu dose today for renal function. Patient's family has 

remained steadily at his bedside. D/C plan remains [facility] for 

PT/OT/RN/CNA. CM will follow.

 

Date Signed:  03/01/2018 02:16 PM

Electronically Signed By:Julieth Pablo LCSW

## 2018-03-01 NOTE — SOAPPROG
SOAP Progress Note


Assessment/Plan: 


Assessment/Plan:





TIGRE: nonoliguric, Cr down from 3.6 to 1.3, although still having some 

fluctuations that are likely hemodynamic in nature.


 - No need for HD.


 - Will continue to monitor.


 - Avoid hypotension and nephrotoxins.





Hypernatremia: Na back up to 161 this am.


 - D5W increased to 75 ml/hr.


 - Will continue to monitor q6h and adjust free water as needed.





Shock: pt remains on Levophed.











Subjective: 


No acute events overnight.  Pt remain on BiPAP and Levophed.


Objective: 





 Vital Signs











Temp Pulse Resp BP Pulse Ox


 


 38 C   78   25 H  116/53 L  96 


 


 03/01/18 08:00  03/01/18 08:00  03/01/18 08:00  03/01/18 08:00  03/01/18 08:00








 Microbiology











 02/28/18 06:20 Respiratory Panel (PCR) - Final





 Nasal, Sinus - Swab    Influenza Virus Type A H3








 Laboratory Results





 02/28/18 04:15 





 











 02/28/18 03/01/18 03/02/18





 05:59 05:59 05:59


 


Intake Total 3208 2136 


 


Output Total 4375 1740 


 


Balance -1167 396 








 











PT  14.8 SEC (12.0-15.0)   02/25/18  20:12    


 


INR  1.14  (0.83-1.16)   02/25/18  20:12    








General: sedated, no acute distress


Eyes: sclerae nonicteric


OP: clear


CV: RRR


Resp: on BiPAP


Abd: Soft, NT/ND


Ext: no edema BLE





ICD10 Worksheet


Patient Problems: 


 Problems











Problem Status Onset


 


Atrial fibrillation Acute  


 


Hypothermia Acute  


 


Non-ketotic hyperosmolar coma Acute

## 2018-03-02 LAB
INR PPP: 1.36 (ref 0.83–1.16)
PLATELET # BLD: 73 10^3/UL (ref 150–400)
PLATELET # BLD: 79 10^3/UL (ref 150–400)
PROTHROMBIN TIME: 16.9 SEC (ref 12–15)

## 2018-03-02 RX ADMIN — INSULIN GLARGINE SCH UNITS: 100 INJECTION, SOLUTION SUBCUTANEOUS at 11:41

## 2018-03-02 RX ADMIN — ACETAMINOPHEN PRN MG: 650 SUPPOSITORY RECTAL at 10:58

## 2018-03-02 RX ADMIN — INSULIN LISPRO SCH UNITS: 100 INJECTION, SOLUTION INTRAVENOUS; SUBCUTANEOUS at 20:10

## 2018-03-02 RX ADMIN — DEXMEDETOMIDINE HYDROCHLORIDE SCH MLS: 100 INJECTION, SOLUTION, CONCENTRATE INTRAVENOUS at 13:08

## 2018-03-02 RX ADMIN — SODIUM CHLORIDE SCH MLS: 900 INJECTION, SOLUTION INTRAVENOUS at 00:33

## 2018-03-02 RX ADMIN — OSELTAMIVIR PHOSPHATE SCH: 6 POWDER, FOR SUSPENSION ORAL at 18:29

## 2018-03-02 RX ADMIN — OSELTAMIVIR PHOSPHATE SCH MG: 6 POWDER, FOR SUSPENSION ORAL at 18:11

## 2018-03-02 RX ADMIN — SODIUM CHLORIDE SCH MLS: 900 INJECTION, SOLUTION INTRAVENOUS at 01:36

## 2018-03-02 RX ADMIN — Medication SCH MLS: at 20:10

## 2018-03-02 RX ADMIN — OSELTAMIVIR PHOSPHATE SCH MG: 6 POWDER, FOR SUSPENSION ORAL at 21:07

## 2018-03-02 RX ADMIN — INSULIN LISPRO SCH UNITS: 100 INJECTION, SOLUTION INTRAVENOUS; SUBCUTANEOUS at 17:14

## 2018-03-02 RX ADMIN — DEXMEDETOMIDINE HYDROCHLORIDE SCH MLS: 100 INJECTION, SOLUTION, CONCENTRATE INTRAVENOUS at 21:07

## 2018-03-02 RX ADMIN — INSULIN LISPRO SCH UNIT: 100 INJECTION, SOLUTION INTRAVENOUS; SUBCUTANEOUS at 05:22

## 2018-03-02 RX ADMIN — INSULIN LISPRO SCH UNITS: 100 INJECTION, SOLUTION INTRAVENOUS; SUBCUTANEOUS at 12:38

## 2018-03-02 RX ADMIN — SODIUM CHLORIDE SCH MLS: 900 INJECTION, SOLUTION INTRAVENOUS at 02:02

## 2018-03-02 RX ADMIN — DEXTROSE MONOHYDRATE SCH MLS: 5 INJECTION, SOLUTION INTRAVENOUS at 08:27

## 2018-03-02 NOTE — PDINTPN
Intensivist Progress Note


Assessment/Plan: 


Assessment:


Hypernatremia: Improved from yesterday, now 149. On D5, followed by Nephrology. 


Hyperglycemia: Improved, not normalized. On insulin gtt.


Influenza: Likely contributes to hypoxemia, delirium


Elevated d-dimer: At risk for PE, which could explain his hypoxemia/hypotension

, although there are several other possible explanations. CTA  posed a bit of 

risk for TIGRE given recently elevated Cr, but this is less of a concern given 

improvement in renal function.


Hypoxemic respiratory failure: Multifactorial, with fluid shifts, Influenza, 

atelectasis, possible aspiration. Was on Vapotherm, now doing well with nasal 

cannula


Hemodynamics: Hypotension improved, almost off NE. HR normal.





Plan: Continue supplemental oxygen. Consider CTA in the next day or two if Cr 

remains OK, but he's clinically improved. Increase Tamiflu dose for improved 

renal function. Place feeding tube and start TFs. Start Lantus and increase 

frequency of SSI checks.








03/02/18 13:12





03/02/18 13:14





Subjective: 





Delirious, not answering questions


Objective: 





 Vital Signs











Temp Pulse Resp BP Pulse Ox


 


 38.7 C H  86   20   106/35 L  93 


 


 03/02/18 13:00  03/02/18 13:00  03/02/18 13:00  03/02/18 13:00  03/02/18 13:00








 Laboratory Results





 03/02/18 04:30 





 03/02/18 10:30 





 











 03/01/18 03/02/18 03/03/18





 05:59 05:59 05:59


 


Intake Total 2136 2806.7 


 


Output Total 1740 1960 


 


Balance 396 846.7 








 











PT  14.8 SEC (12.0-15.0)   02/25/18  20:12    


 


INR  1.14  (0.83-1.16)   02/25/18  20:12    








CXR: LLL atelectasis/infiltrate. Images reviewed by me.





Physical Exam





- Physical Exam


General Appearance: alert, no apparent distress


EENT: normal ENT inspection


Neck: normal inspection


Respiratory: lungs clear, normal breath sounds


Cardiac/Chest: normal peripheral pulses, regular rate, rhythm


Abdomen: normal bowel sounds, non-tender


Skin: normal color, warm/dry


Extremities: normal inspection


Neuro/Psych: No alert, No normal mood/affect, No oriented x 3





ICD10 Worksheet


Patient Problems: 


 Problems











Problem Status Onset


 


Atrial fibrillation Acute  


 


Hypothermia Acute  


 


Non-ketotic hyperosmolar coma Acute

## 2018-03-02 NOTE — HOSPPROG
Hospitalist Progress Note


Assessment/Plan: 





82-year-old gentleman found confused at his independent living apartment with 

elevated blood sugars





# acute hypoxic respiratory failure-improvement overnight from BiPAP to 6L oxy 

mask this am - inluenza A positive


   Chest x-ray (personally reviewed and interpreted) without significant 

infiltrates - hypoventilation peribronchial inflammation


   - starting tamiflu per tube


   - continue aggressive pulmonary support





# influenza a presumed acute- will start Tamiflu per tube today





# Severe sepsis w/ acute hypotension (leukocytosis, tachycardia, elevated 

lactate, acute renal failure ) - bcx NGTD x 2 sets


   weaned off pressors 


   - cont maintenance IVF





# acute thrombocytopenia - platelets 274-> 73 this am - has received lovenox 

during his stay- DIC of course also possible 


   - hold heparin


   - DIC panel


   - add differential to am labs





# severe hypernatremia- sodium near 170 -> 149 this a.m.


   - cont hypotonic fluids per nephrology


   - cont serial  labs





# severe Hyperglycemia- HHS versus DKA.  BS > 1000  - has fluctuating sugars 

since admit


   - cont SSI 


   - adding lantus today





#Acute encephalopathy - likely multifactorial with sodium near 170 and BS > 1000


   - follow with aggressive supportive care


   - if persists consider brain imaging





#Acute kidney injury - most likely prerenal in setting of severe dehydration.- 

creatinine 3.4 -> 1.1


   - cont hydration and monitoring


  


# Atrial fibrillation (Acute) - resolved and has not recurred





# severe protein calorie malnutrition -Under weight-patient's current weight is 

54.4 kg.  


   - starting TF's





#FEN - Diet NPO. 


#PPX - hold heparin subcu- platelets 73 - scd's


#COR - FULL at this time.  


Dispo - patient admitted to ICU.  He is critically ill requiring close 

monitoring.  Patient has been admitted to inpatient status and anticipate 

greater than 2 midnight stay.





I have discussed the case with Dr. Llamas will place feeding tube today for 

nutrition


Subjective: no events overnight


Objective: 


 Vital Signs











Temp Pulse Resp BP Pulse Ox


 


 38.6 C H  80   34 H  127/64 H  96 


 


 03/02/18 11:00  03/02/18 11:00  03/02/18 11:00  03/02/18 11:00  03/02/18 11:00








 Laboratory Results





 03/02/18 04:30 





 03/02/18 10:30 





 











 03/01/18 03/02/18 03/03/18





 05:59 05:59 05:59


 


Intake Total 2136 2806.7 


 


Output Total 1740 1960 


 


Balance 396 846.7 








 











PT  14.8 SEC (12.0-15.0)   02/25/18  20:12    


 


INR  1.14  (0.83-1.16)   02/25/18  20:12    














- Physical Exam


Constitutional: chronically ill appearing


Eyes: anicteric sclera


Ears, Nose, Mouth, Throat: dry mucous membranes


Cardiovascular: regular rate and rhythym


Respiratory: rhonchi


Gastrointestinal: normoactive bowel sounds


Genitourinary: no bladder fullness


Skin: warm


Musculoskeletal: No asymmetric calves


Neurologic: No AAOx3


Psychiatric: No agitated


Lymph, Heme, Immunologic: no cervical LAD





ICD10 Worksheet


Patient Problems: 


 Problems











Problem Status Onset


 


Atrial fibrillation Acute  


 


Hypothermia Acute  


 


Non-ketotic hyperosmolar coma Acute

## 2018-03-02 NOTE — WOCRNPDOC
WOCRN Advanced Assessment Note





- Skin Integrity Problem, Advanced Assess





  ** Bilateral Sacrum Pressure Injury


Dressing Type: Allevyn Life


Dressing Description: Intact


Exudate Amount: None


Integumentary Issue Intervention: Dressing Applied (Mepilex Border Sacrum), 

Dressing Initialed & Dated


Bebe Wound Tissue: Blanching, Intact


Bebe Wound Swelling: None


Wound Bed Color: Purple


Site Measurement - Head-to-Toe Length X Width X Depth (cm): L lower sacrum: 

[id number].  R lower sacrum: [id number]


Pressure Injury Stage: Deep Tissue Injury (DTI)


Pressure Injury Present on Admit: No


Skin Integrity Problem Comment: Two discrete, dusky,dark purple areas on patient

's lower sacrum (upper buttocks), bilateral, skin intact. Presentation is 

different in that the purple hue is patchy. However, this is a change in the 

condition of his skin, and it is over a bony prominence; documenting as a 

suspected deep tissue injury. Placed Mepilex Border sacrum dressing, and 

discussed off-loading the site w/ patient's Staff RN, Gia Nielsen.

## 2018-03-02 NOTE — ASMTCMCOM
CM Note

 

CM Note                       

Notes:

Unable to meet with patient as he is delirious and not answering questions, even though patient is 

clinically improved. Patient is a resident at [facility] and has had [facility] in the 


past. D/C needs remain TBD. CM will follow.

 

Date Signed:  03/02/2018 01:20 PM

Electronically Signed By:Julieth Pablo LCSW

## 2018-03-02 NOTE — SOAPPROG
SOAP Progress Note


Assessment/Plan: 


Assessment/Plan:





TIGRE: nonoliguric, Cr down from 3.6 to 1.1.


 - No need for HD.


 - Will continue to monitor.


 - Avoid hypotension and nephrotoxins.





Hypernatremia: Na was 155 yesterday, now down to 149 late this morning.


 - Will continue D5W. 


 - Will continue to monitor q8h and adjust free water as needed.





Shock: pt is just off Levophed, will continue to monitor BP closely.





Subjective: 





No acute events overnight.  Pt remains delirious, is currently off pressors but 

BP is a little soft.


Objective: 





 Vital Signs











Temp Pulse Resp BP Pulse Ox


 


 38.7 C H  86   20   106/35 L  93 


 


 03/02/18 13:00  03/02/18 13:00  03/02/18 13:00  03/02/18 13:00  03/02/18 13:00








 Laboratory Results





 03/02/18 04:30 





 03/02/18 10:30 





 











 03/01/18 03/02/18 03/03/18





 05:59 05:59 05:59


 


Intake Total 2136 2806.7 


 


Output Total 1740 1960 


 


Balance 396 846.7 








 











PT  14.8 SEC (12.0-15.0)   02/25/18  20:12    


 


INR  1.14  (0.83-1.16)   02/25/18  20:12    








General: sedated, no acute distress


Eyes: PERRL


OP: clear


CV: RRR


Resp: nonlabored respirations on oxymask


Abd: Soft, NT/ND


Ext: no edema BLE








ICD10 Worksheet


Patient Problems: 


 Problems











Problem Status Onset


 


Atrial fibrillation Acute  


 


Hypothermia Acute  


 


Non-ketotic hyperosmolar coma Acute

## 2018-03-03 RX ADMIN — DEXMEDETOMIDINE HYDROCHLORIDE SCH MLS: 100 INJECTION, SOLUTION, CONCENTRATE INTRAVENOUS at 05:37

## 2018-03-03 RX ADMIN — METOPROLOL TARTRATE SCH MG: 25 TABLET, FILM COATED ORAL at 12:30

## 2018-03-03 RX ADMIN — ACETAMINOPHEN PRN MG: 160 SOLUTION ORAL at 04:11

## 2018-03-03 RX ADMIN — INSULIN LISPRO SCH UNITS: 100 INJECTION, SOLUTION INTRAVENOUS; SUBCUTANEOUS at 21:16

## 2018-03-03 RX ADMIN — HALOPERIDOL LACTATE PRN MG: 5 INJECTION, SOLUTION INTRAMUSCULAR at 15:59

## 2018-03-03 RX ADMIN — ACETAMINOPHEN PRN MG: 160 SOLUTION ORAL at 21:17

## 2018-03-03 RX ADMIN — INSULIN LISPRO SCH UNITS: 100 INJECTION, SOLUTION INTRAVENOUS; SUBCUTANEOUS at 16:42

## 2018-03-03 RX ADMIN — METOPROLOL TARTRATE SCH: 25 TABLET, FILM COATED ORAL at 21:16

## 2018-03-03 RX ADMIN — OSELTAMIVIR PHOSPHATE SCH MG: 6 POWDER, FOR SUSPENSION ORAL at 21:32

## 2018-03-03 RX ADMIN — DEXTROSE MONOHYDRATE SCH MLS: 5 INJECTION, SOLUTION INTRAVENOUS at 02:27

## 2018-03-03 RX ADMIN — INSULIN LISPRO SCH UNITS: 100 INJECTION, SOLUTION INTRAVENOUS; SUBCUTANEOUS at 04:08

## 2018-03-03 RX ADMIN — INSULIN GLARGINE SCH UNITS: 100 INJECTION, SOLUTION SUBCUTANEOUS at 08:30

## 2018-03-03 RX ADMIN — ACETAMINOPHEN PRN MG: 160 SOLUTION ORAL at 14:57

## 2018-03-03 RX ADMIN — INSULIN LISPRO SCH UNITS: 100 INJECTION, SOLUTION INTRAVENOUS; SUBCUTANEOUS at 00:10

## 2018-03-03 RX ADMIN — INSULIN LISPRO SCH UNITS: 100 INJECTION, SOLUTION INTRAVENOUS; SUBCUTANEOUS at 12:58

## 2018-03-03 RX ADMIN — FAMOTIDINE SCH MG: 20 TABLET, FILM COATED ORAL at 21:16

## 2018-03-03 RX ADMIN — INSULIN LISPRO SCH UNITS: 100 INJECTION, SOLUTION INTRAVENOUS; SUBCUTANEOUS at 08:29

## 2018-03-03 RX ADMIN — OSELTAMIVIR PHOSPHATE SCH MG: 6 POWDER, FOR SUSPENSION ORAL at 08:30

## 2018-03-03 NOTE — SOAPPROG
SOAP Progress Note


Assessment/Plan: 


Assessment:


1. arf due to prerenal azotemia: resolved





2. HyperNa: due to osmotic diuresis, last Na 146 earlier today and now off d5w. 

Strongly suspect this will need to be resumed or h2o per tube administered to 

prevent worsening.





Will sign off.























Plan:





03/03/18 18:25





Subjective: 





Awake but not conversant. Unable to tell me if he is thirsty. D5w apparently d/c

'd earlier today.


Objective: 





 Vital Signs











Temp Pulse Resp BP Pulse Ox


 


 38.6 C H  143 H  44 H  113/76   90 L


 


 03/03/18 18:00  03/03/18 18:00  03/03/18 18:00  03/03/18 18:00  03/03/18 18:00








 Laboratory Results





 03/03/18 05:20 





 











 03/02/18 03/03/18 03/04/18





 05:59 05:59 05:59


 


Intake Total 2806.7 3958 988.5


 


Output Total 1960 2600 1100


 


Balance 846.7 1358 -111.5








 











PT  16.9 SEC (12.0-15.0)  H  03/02/18  14:00    


 


INR  1.36  (0.83-1.16)  H  03/02/18  14:00    














Physical Exam





- Physical Exam


General Appearance: cachetic


Respiratory: lungs clear (anteriorly)


Cardiac/Chest: tachycardia


Extremities: swelling (dependent edema)





ICD10 Worksheet


Patient Problems: 


 Problems











Problem Status Onset


 


Atrial fibrillation Acute  


 


Hypothermia Acute  


 


Non-ketotic hyperosmolar coma Acute

## 2018-03-03 NOTE — HOSPPROG
Hospitalist Progress Note


Assessment/Plan: 





82-year-old gentleman found confused at his independent living apartment with 

elevated blood sugars





# acute hypoxic respiratory failure-improvement overnight from BiPAP to 6 -> 5L 

oxy mask this am - influenza A positive


   Chest x-ray 3/2 without significant infiltrates - hypoventilation 

peribronchial inflammation


   - cont tamiflu per tube


   - continue aggressive pulmonary support


   - CTA planned as below





# influenza A - cont Tamiflu per tube 





# tachycardia - New today.  EKG shows sinus tac with frequent PAC's, not very 

responsive to dilt


   - change to metoprolol


   - CTA to r/o PE given acute change in status (HR was 80's).  Would recheck 

plts and consider Fondaparinux if rules in for PE given abrupt drop in plts (

HIT Ab pending)





# Severe sepsis w/ acute hypotension (leukocytosis, tachycardia, elevated 

lactate, acute renal failure) - bcx NGTD x 2 sets, off pressors 


   - cont maintenance IVF





# acute thrombocytopenia - platelets 274-> 73 this am - has received heparin 

during his stay


   - DIC panel with elevated fibrinogen, makes acute DIC unlikely


   - holding heparin


   - send HIT 





# severe hypernatremia- sodium near 170 -> 145 this a.m., renal following


   - d/c hypotonic fluids


   - add free water to tube 50 mLs q4h


   - follow





# severe Hyperglycemia- HHS versus DKA.  BS > 1000 on arrival - has fluctuating 

sugars since admit


   - cont Lantus, SSI 





#Acute encephalopathy - likely multifactorial with sodium near 170 and BS > 1000


   - follow with aggressive supportive care


   - d/c precedex, prn haldol


   - if persists consider brain imaging





#Acute kidney injury - most likely prerenal in setting of severe dehydration.- 

creatinine 3.4 -> 1.1


   - cont hydration and monitoring





# severe protein calorie malnutrition -Under weight-patient's current weight is 

54.4 kg.  


   - starting TF's





#FEN - Diet NPO. 


#PPX - hold heparin subcu- platelets 73 - scd's


#COR - Addressed code status with pt's medical proxy, Delfina Meredith.  She is 

confident pt would not want CPR or intubation and requests he be changed to DNR 

based on her previous discussions with him.  This will be honored, now DNR.


Dispo - patient admitted to ICU.  He is critically ill requiring close 

monitoring.  45 minutes crit care time.





I have discussed the case with Dr. Llamas 


Subjective: Pt minimally responsive, doesn't follow commands.  Appears SOB.  HR 

up this afternoon.  He occassionally grimaces in pain.  +fever today.


Objective: 


 Vital Signs











Temp Pulse Resp BP Pulse Ox


 


 38.1 C   119 H  20   126/55 H  91 L


 


 03/03/18 11:00  03/03/18 11:53  03/03/18 11:00  03/03/18 11:00  03/03/18 11:00








 Laboratory Results





 03/03/18 05:20 





 











 03/02/18 03/03/18 03/04/18





 05:59 05:59 05:59


 


Intake Total 2806.7 3958 


 


Output Total 1960 2600 150


 


Balance 846.7 1358 -150








 











PT  16.9 SEC (12.0-15.0)  H  03/02/18  14:00    


 


INR  1.36  (0.83-1.16)  H  03/02/18  14:00    














- Physical Exam


Constitutional: no apparent distress


Eyes: PERRL


Ears, Nose, Mouth, Throat: moist mucous membranes


Cardiovascular: tachycardia


Respiratory: inspiratory crackles, respiratory distress


Gastrointestinal: normoactive bowel sounds, soft, non-tender abdomen


Skin: warm


Psychiatric: encephalopathic





ICD10 Worksheet


Patient Problems: 


 Problems











Problem Status Onset


 


Atrial fibrillation Acute  


 


Hypothermia Acute  


 


Non-ketotic hyperosmolar coma Acute

## 2018-03-03 NOTE — CPEKG
Heart Rate: 98

RR Interval: 612

P-R Interval: 105

QRSD Interval: 68

QT Interval: 348

QTC Interval: 445

P Axis: 27

QRS Axis: -17

T Wave Axis: 63

EKG Severity - ABNORMAL ECG -

EKG Impression: SINUS TACHYCARDIA

EKG Impression: MULTIPLE ATRIAL PREMATURE COMPLEXES

EKG Impression: SHORT [place] INTERVAL, ACCELERATED AV CONDUCTION

EKG Impression: BORDERLINE LEFT AXIS DEVIATION

Electronically Signed By: Hugo Perez 03-Mar-2018 23:33:03

## 2018-03-03 NOTE — PDINTPN
Intensivist Progress Note


Assessment/Plan: 


Assessment:


Hypernatremia: No normalized. On D5, followed by Nephrology. 


Hyperglycemia: Improved, not normalized. On insulin gtt.


Influenza: Likely contributes to hypoxemia, delirium


Elevated d-dimer: At risk for PE, which could explain his hypoxemia/hypotension

, although there are several other possible explanations. CTA  posed a bit of 

risk for TIGRE given recently elevated Cr, but this is less of a concern given 

improvement in renal function.


Hypoxemic respiratory failure: Multifactorial, with fluid shifts, Influenza, 

atelectasis, possible aspiration. Was on Vapotherm, now doing well with nasal 

cannula


Hemodynamics: Hypotension improved, almost off NE. HR normal.


Nutrition: SBFT placed 3/2. On TF.





Plan: Continue supplemental oxygen. Consider CTA in the next day or two if Cr 

remains OK, but he's clinically improved. Increase Tamiflu dose for improved 

renal function. Advance TF. Increase Lantus and continue frequent SSI checks.





03/03/18 09:06





Subjective: 





Not responding to questions


Objective: 





 Vital Signs











Temp Pulse Resp BP Pulse Ox


 


 38.3 C   85   29 H  108/66   94 


 


 03/03/18 08:00  03/03/18 08:00  03/03/18 08:00  03/03/18 08:00  03/03/18 08:00








 Laboratory Results





 03/03/18 05:20 





 03/03/18 05:20 





 











 03/02/18 03/03/18 03/04/18





 05:59 05:59 05:59


 


Intake Total 2806.7 3958 


 


Output Total 1960 2600 150


 


Balance 846.7 1358 -150








 











PT  16.9 SEC (12.0-15.0)  H  03/02/18  14:00    


 


INR  1.36  (0.83-1.16)  H  03/02/18  14:00    














Physical Exam





- Physical Exam


General Appearance: other (somnolent)


EENT: normal ENT inspection


Neck: normal inspection


Respiratory: lungs clear, normal breath sounds


Abdomen: normal bowel sounds, non-tender


Skin: normal color, warm/dry


Extremities: normal inspection


Neuro/Psych: No alert, No normal mood/affect, No oriented x 3 (Opens eyes to 

loud voice, not following commands. Moves all extremities.)





ICD10 Worksheet


Patient Problems: 


 Problems











Problem Status Onset


 


Atrial fibrillation Acute  


 


Hypothermia Acute  


 


Non-ketotic hyperosmolar coma Acute

## 2018-03-04 LAB — PLATELET # BLD: 84 10^3/UL (ref 150–400)

## 2018-03-04 RX ADMIN — Medication PRN DOSE: at 12:16

## 2018-03-04 RX ADMIN — ACETAMINOPHEN PRN MG: 160 SOLUTION ORAL at 20:04

## 2018-03-04 RX ADMIN — ACETAMINOPHEN PRN MG: 160 SOLUTION ORAL at 01:53

## 2018-03-04 RX ADMIN — DEXMEDETOMIDINE HYDROCHLORIDE SCH MLS: 100 INJECTION, SOLUTION, CONCENTRATE INTRAVENOUS at 13:48

## 2018-03-04 RX ADMIN — ACETAMINOPHEN PRN MG: 160 SOLUTION ORAL at 08:15

## 2018-03-04 RX ADMIN — INSULIN LISPRO SCH UNITS: 100 INJECTION, SOLUTION INTRAVENOUS; SUBCUTANEOUS at 00:59

## 2018-03-04 RX ADMIN — DEXTROSE MONOHYDRATE SCH MLS: 5 INJECTION, SOLUTION INTRAVENOUS at 22:52

## 2018-03-04 RX ADMIN — ACETAMINOPHEN PRN MG: 160 SOLUTION ORAL at 14:44

## 2018-03-04 RX ADMIN — Medication SCH MG: at 20:05

## 2018-03-04 RX ADMIN — DEXTROSE MONOHYDRATE SCH MLS: 5 INJECTION, SOLUTION INTRAVENOUS at 15:35

## 2018-03-04 RX ADMIN — OSELTAMIVIR PHOSPHATE SCH MG: 6 POWDER, FOR SUSPENSION ORAL at 08:15

## 2018-03-04 RX ADMIN — INSULIN LISPRO SCH UNITS: 100 INJECTION, SOLUTION INTRAVENOUS; SUBCUTANEOUS at 05:47

## 2018-03-04 RX ADMIN — INSULIN LISPRO SCH UNITS: 100 INJECTION, SOLUTION INTRAVENOUS; SUBCUTANEOUS at 12:21

## 2018-03-04 RX ADMIN — METOPROLOL TARTRATE SCH MG: 25 TABLET, FILM COATED ORAL at 08:15

## 2018-03-04 RX ADMIN — METOPROLOL TARTRATE SCH MG: 25 TABLET, FILM COATED ORAL at 20:05

## 2018-03-04 RX ADMIN — INSULIN LISPRO SCH UNITS: 100 INJECTION, SOLUTION INTRAVENOUS; SUBCUTANEOUS at 16:19

## 2018-03-04 RX ADMIN — FAMOTIDINE SCH MG: 20 TABLET, FILM COATED ORAL at 20:05

## 2018-03-04 RX ADMIN — OSELTAMIVIR PHOSPHATE SCH MG: 6 POWDER, FOR SUSPENSION ORAL at 20:05

## 2018-03-04 RX ADMIN — INSULIN LISPRO SCH UNITS: 100 INJECTION, SOLUTION INTRAVENOUS; SUBCUTANEOUS at 09:47

## 2018-03-04 RX ADMIN — INSULIN LISPRO SCH UNITS: 100 INJECTION, SOLUTION INTRAVENOUS; SUBCUTANEOUS at 20:04

## 2018-03-04 RX ADMIN — Medication PRN MG: at 01:27

## 2018-03-04 NOTE — CPEKG
Heart Rate: 124

RR Interval: 484

P-R Interval: 121

QRSD Interval: 72

QT Interval: 320

QTC Interval: 460

P Axis: 60

QRS Axis: -5

T Wave Axis: 68

EKG Severity - ABNORMAL ECG -

EKG Impression: SINUS TACHYCARDIA

EKG Impression: MULTIFORM VENTRICULAR PREMATURE COMPLEXES

EKG Impression: MULTIPLE PACs WERE ALSO NOTED

Electronically Signed By: Hugo Perez 05-Mar-2018 08:18:57

## 2018-03-04 NOTE — NEUROPROG
Assessment: 








HOSPITAL NEUROLOGY CONSULT


REQUESTING: Kelly Cushing, DO


REASON: encephalopathy





HPI:





82 year old man admitted 2/25 after being found obtunded at his independent 

living facility during a welfare check.  He reportedly tends to isolate himself

, with a background of depression, but had been seen the day prior by staff in 

his usual state of health.  On ED arrival he was found to be have profoundly 

elevated blood glucose levels (>800) with severe acidemia (VBG pH 7.14, lactate 

4.4) and elevated B-hydroxybutyrate levels.  He was also found to have prerenal 

TIGRE and profound hyponatremia (in 160s).  He was in respiratory distress and 

tested positive for influenza - he is being supported on supplemental O2.  He 

now has patchy infiltrates on CT chest (used to rule out PE for his respiratory 

failure).  He has also developed thrombocytopenia.  Supportive measures were 

initiated for the above.  However, despite improving glucose, sodium, renal 

function, he remains obtunded.  His procalcitonin has been elevated still.  He 

now has a rather coarse cough.  He has also been agitated and has just been 

weaned from dexmedetomidine infusion, but still getting intermittent pushes of 

haloperidol and lorazepam.  CT head wo was done in the ED revealing global 

atrophy and periventricular signal attenuation likely reflective of chronic 

microvascular ischemia.  





ROS:


As per the HPI, otherwise a complete 12 point ROS was performed and is negative





ALLERGIES AND MEDS:


As recorded in the EMR - reviewed and reconciled





PFSH:


As per the intake H&P by Dr. Salguero from 2/25/18  





EXAM:


VS reviewed in EMR


GEN: disheveled elderly man layin in bed with eyes closed, intermittent coarse 

cough


HEENT: NCAT, sclera anicteric, conjunctiva not injected, MMD


NECK: supple, nontender, no meningismus


CV: RRR s1 s2 wo m/r/c/g.  Carotid pulses 2+ wo bruit


NEURO:


MS: eyes closed, opens eyes briefly to nox stim.  Says "ouch" to nox stimuli.  

Seems to move eyes toward examiner when yelling his name.  Unable to assess 

higher cortical function due to nonresponsiveness


CN: pupils 3mm round and reactive.  Unable to visualize fundi.  No blink to 

threat.  Primary gaze dysconjugate with left eye being slightly depressed.  No 

VORs.  Grimaces and yells to nox stim of the supraorbital notches.  Corneals 

preserved.   Face symmetric.   


MOTOR: low tone.  Low bulk.  No adventitial movements.  Seen moving the arms 

spontaneously, but no purposefully


SENSORY: localizes to nox stim in BUEs, withdraws to nox stim in BLEs


COORD: unable to assess


REFLEX: plantars down.  No clonus.  Absent DTRs.


GAIT: unable to assess











DATA REVIEW:


Labs reviewed in EMR








PERSONALLY INTERPRETED RESULTS AND DATA:


CT head wo - per HPI





IMPRESSION AND RECOMMENDATIONS:





// SUSPECT TOXIC/METABOLIC ENCEPHALOPATHY


// DKA - IMPROVED


// HYPERNATREMIA - IMPROVED


// TIGRE - IMPROVED


// INFLUENZA


// RESPIRATORY DECLINE


// LIKELY HCAP VS. ASPIRATION


// METABOLIC ACIDOSIS - IMPROVED





Patient remains obtunded despite supportive measures for his multiple medical 

issues.  I suspect prolonged encephalopathy from advanced age/low cognitive 

reserve in the setting of a profound number of severe metabolic disturbances.  

He seems to still have some ongoing pulmonary issues, with possible 

superimposed bacterial pneumonia.  Hypernatremia has only recently been 

resolved.  I also suspect the administration of haloperidol and lorazepam may 

be contributing to his reduced LOC. 


Would continue with medical optimization per primary team and ICU team.


Consider using alternative medications for agitation, such as olanzapine IM 

PRN.  


Delirium precautions, including lights on/window shade up from [phone], TV/

radio on during daytime for stimulation, maintain upright position as much as 

possible during daytime, frequent orientation, sensory optimization, 

nonpharmacologic sleep enhancement with cool/quiet/dark room after 2100.


Will continue to follow - Dr. Orellana to assume neurology service tomorrow.








Objective: 





 Vital Signs











Temp Pulse Resp BP Pulse Ox


 


 38.0 C   156 H  22 H  111/61   92 


 


 03/04/18 12:00  03/04/18 12:00  03/04/18 12:00  03/04/18 12:00  03/04/18 12:00








 Laboratory Results





 03/04/18 04:10 





 03/04/18 04:10 





 











 03/03/18 03/04/18 03/05/18





 05:59 05:59 05:59


 


Intake Total 3958 2955.3 


 


Output Total 2600 2100 625


 


Balance 1358 855.3 -625








 











PT  16.9 SEC (12.0-15.0)  H  03/02/18  14:00    


 


INR  1.36  (0.83-1.16)  H  03/02/18  14:00    











Allergies/Adverse Reactions: 


 





Unable to Assess Allergy (Verified 03/03/18 04:32)

## 2018-03-04 NOTE — HOSPPROG
Hospitalist Progress Note


Assessment/Plan: 





82-year-old gentleman found confused at his independent living apartment with 

elevated blood sugars, now Influenza A positive





# acute hypoxic respiratory failure- 92% on 6 LPM - influenza A positive.  CTA 

last night personally reviewed and interpreted- neg for PE, b/l patchy 

infiltrates


   - note previous PCT was >4.  Recheck PCT this am and if remains elevated, 

consider atbx for HCAP


   - cont tamiflu per tube


   - continue aggressive pulmonary support





# influenza A - cont Tamiflu per tube 





# tachycardia - Sinus with PAC's, telemetry personally reviewed and interpreted

, received IVF's overnight, improved today


   - cont po metoprolol





# Severe sepsis w/ acute hypotension (leukocytosis, tachycardia, elevated 

lactate, acute renal failure) - bcx NGTD x 2 sets, off pressors 





# acute thrombocytopenia - platelets 274-> 73 this am - has received heparin 

during his stay


   - DIC panel with elevated fibrinogen, makes acute DIC unlikely


   - holding heparin


   - HIT pending 





# severe hypernatremia- sodium near 170 on arrival -> 145 yesterday with 

hypotonic fluids, which were d/c'd, but Na back up to 150 this am after NS 

overnight


   - increase free water in tube feeds to 200 mLs q2h


   - recheck bmp this afternoon





# severe Hyperglycemia- HHS versus DKA.  BS > 1000 on arrival - now bg's 200's


   - increase Lantus


   - cont SSI





#acute encephalopathy - persists, likely multifactorial with sodium near 170 

and BS > 1000, ?infection related.  CT brain neg on admission.


   - considering addition of atbx for PNA, repeat PCT pending


   - wean precedex 


   - prn haldol for agitation


   - neurology consulted for opinion regarding further imaging vs LP





#Acute kidney injury - Cr normalized with IVF's





# severe protein calorie malnutrition -Under weight-patient's current weight is 

54.4 kg.  


   - cont TF's





#FEN - tube feeds


#PPX - holding heparin subcu- platelets 73 - scd's


#COR - Addressed code status with pt's medical proxy, Delfina Meredith.  Pt now DNR.


Dispo - patient admitted to ICU.  He is critically ill requiring close 

monitoring.  45 minutes crit care time.





I have discussed the case with Dr. Llamas and care team


Subjective: Pt remains encephalopathic.  Not responding or following commands.  

Still low grade fevers.  +tachypnea, intermittently agitated.


Objective: 


 Vital Signs











Temp Pulse Resp BP Pulse Ox


 


 38.2 C   90   30 H  120/90 H  92 


 


 03/04/18 08:00  03/04/18 10:00  03/04/18 10:00  03/04/18 10:00  03/04/18 10:00








 Laboratory Results





 03/04/18 04:10 





 03/04/18 04:10 





 











 03/03/18 03/04/18 03/05/18





 05:59 05:59 05:59


 


Intake Total 3958 2955.3 


 


Output Total 2600 2100 150


 


Balance 1358 855.3 -150








 











PT  16.9 SEC (12.0-15.0)  H  03/02/18  14:00    


 


INR  1.36  (0.83-1.16)  H  03/02/18  14:00    














- Physical Exam


Constitutional: no apparent distress


Eyes: PERRL


Ears, Nose, Mouth, Throat: moist mucous membranes


Cardiovascular: regular rate and rhythym


Respiratory: no respiratory distress, inspiratory crackles


Gastrointestinal: normoactive bowel sounds, soft, non-tender abdomen


Skin: warm


Psychiatric: encephalopathic





ICD10 Worksheet


Patient Problems: 


 Problems











Problem Status Onset


 


Atrial fibrillation Acute  


 


Hypothermia Acute  


 


Non-ketotic hyperosmolar coma Acute

## 2018-03-04 NOTE — PDINTPN
Intensivist Progress Note


Assessment/Plan: 


Assessment:


Hypernatremia: Normalized yesterday, now back up again after D5 stopped (due to 

hyperglycemia).


Hyperglycemia: Remains in 200s. On insulin SSI and Lantus


Influenza: Likely contributes to hypoxemia, delirium


Elevated d-dimer: CT negative for PE.


Hypoxemic respiratory failure: Due to bilateral pneumonia/atelectasis. 

Multifactorial, with fluid shifts, Influenza, atelectasis, possible aspiration. 

Was on Vapotherm, now doing well with nasal cannula


Hemodynamics: Hypotension improved, almost off NE. HR normal.


Nutrition: SBFT placed 3/2. On TF.


Tachycradia: Frequent atrial ectopy on ECG. 


Delirium: Persists, despite normalized Na and improved glucose. More alert 

today.





Plan: Continue supplemental oxygen. Try to D/C diltiazem. increase metoprolol 

PRN. Increase fee H2O, may need Lasix if I>O. Increase Lantus and continue 

frequent SSI checks. Melatonin, Precedex at night for sleep, try to hold 

Precedex during the day. Follow Na.





03/04/18 09:53





Subjective: 





Alert but delirious, not following commands.


Objective: 





 Vital Signs











Temp Pulse Resp BP Pulse Ox


 


 38.2 C   91   29 H  112/54 L  94 


 


 03/04/18 08:00  03/04/18 09:00  03/04/18 09:00  03/04/18 09:00  03/04/18 09:00








 Laboratory Results





 03/04/18 04:10 





 03/04/18 04:10 





 











 03/03/18 03/04/18 03/05/18





 05:59 05:59 05:59


 


Intake Total 3958 2955.3 


 


Output Total 2600 2100 150


 


Balance 1358 855.3 -150








 











PT  16.9 SEC (12.0-15.0)  H  03/02/18  14:00    


 


INR  1.36  (0.83-1.16)  H  03/02/18  14:00    








CT Chest: Bibasilar and RUL pneumonia. Images reviewed by me. 





Physical Exam





- Physical Exam


General Appearance: alert, no apparent distress


EENT: normal ENT inspection


Neck: normal inspection


Respiratory: lungs clear, normal breath sounds


Cardiac/Chest: regular rate, rhythm, No edema


Abdomen: normal bowel sounds, non-tender


Skin: normal color, warm/dry


Extremities: normal inspection


Neuro/Psych: alert, No normal mood/affect, No oriented x 3





ICD10 Worksheet


Patient Problems: 


 Problems











Problem Status Onset


 


Atrial fibrillation Acute  


 


Hypothermia Acute  


 


Non-ketotic hyperosmolar coma Acute

## 2018-03-04 NOTE — WOCRNPDOC
WOCRN Advanced Assessment Note





- Skin Integrity Problem, Advanced Assess





  ** Scrotum


Dressing Type: Open to Air


Bebe Wound Tissue: Erythema, Raw, Swollen, Denuded


Bebe Wound Swelling: Moderate


Wound Bed Color: Red


Skin Integrity Problem Comment: Raw, denuded skin noted on scrotum w/ scattered 

satellite lesions, possibly indicative of fungal involvement. Per Staff RN Swati

, patient is constantly moist r/t frequent stooling. Will have nursing initiate 

tx w/ MAD(moisture-associated dermatitis) cream today. Verbal order obtained 

from Dr. Cushing for cream.

## 2018-03-05 RX ADMIN — INSULIN LISPRO SCH UNITS: 100 INJECTION, SOLUTION INTRAVENOUS; SUBCUTANEOUS at 17:12

## 2018-03-05 RX ADMIN — METOPROLOL TARTRATE SCH MG: 25 TABLET, FILM COATED ORAL at 08:38

## 2018-03-05 RX ADMIN — FAMOTIDINE SCH MG: 20 TABLET, FILM COATED ORAL at 23:09

## 2018-03-05 RX ADMIN — ACETAMINOPHEN PRN MG: 160 SOLUTION ORAL at 08:48

## 2018-03-05 RX ADMIN — DEXTROSE MONOHYDRATE SCH MLS: 5 INJECTION, SOLUTION INTRAVENOUS at 05:47

## 2018-03-05 RX ADMIN — DEXMEDETOMIDINE HYDROCHLORIDE SCH MLS: 100 INJECTION, SOLUTION, CONCENTRATE INTRAVENOUS at 21:00

## 2018-03-05 RX ADMIN — INSULIN LISPRO SCH UNITS: 100 INJECTION, SOLUTION INTRAVENOUS; SUBCUTANEOUS at 12:26

## 2018-03-05 RX ADMIN — Medication SCH MG: at 23:10

## 2018-03-05 RX ADMIN — DEXTROSE MONOHYDRATE SCH MLS: 5 INJECTION, SOLUTION INTRAVENOUS at 23:07

## 2018-03-05 RX ADMIN — INSULIN LISPRO SCH UNITS: 100 INJECTION, SOLUTION INTRAVENOUS; SUBCUTANEOUS at 00:40

## 2018-03-05 RX ADMIN — INSULIN LISPRO SCH UNITS: 100 INJECTION, SOLUTION INTRAVENOUS; SUBCUTANEOUS at 04:13

## 2018-03-05 RX ADMIN — OSELTAMIVIR PHOSPHATE SCH MG: 6 POWDER, FOR SUSPENSION ORAL at 08:38

## 2018-03-05 RX ADMIN — METOPROLOL TARTRATE SCH MG: 25 TABLET, FILM COATED ORAL at 23:09

## 2018-03-05 RX ADMIN — ACETAMINOPHEN PRN MG: 160 SOLUTION ORAL at 00:40

## 2018-03-05 RX ADMIN — Medication PRN APP: at 23:10

## 2018-03-05 RX ADMIN — DEXTROSE MONOHYDRATE SCH MLS: 5 INJECTION, SOLUTION INTRAVENOUS at 15:27

## 2018-03-05 RX ADMIN — INSULIN LISPRO SCH: 100 INJECTION, SOLUTION INTRAVENOUS; SUBCUTANEOUS at 23:12

## 2018-03-05 RX ADMIN — INSULIN LISPRO SCH UNITS: 100 INJECTION, SOLUTION INTRAVENOUS; SUBCUTANEOUS at 23:33

## 2018-03-05 RX ADMIN — OSELTAMIVIR PHOSPHATE SCH MG: 6 POWDER, FOR SUSPENSION ORAL at 23:10

## 2018-03-05 RX ADMIN — INSULIN LISPRO SCH UNITS: 100 INJECTION, SOLUTION INTRAVENOUS; SUBCUTANEOUS at 08:00

## 2018-03-05 RX ADMIN — ACETAMINOPHEN PRN MG: 160 SOLUTION ORAL at 23:09

## 2018-03-05 RX ADMIN — FONDAPARINUX SODIUM SCH MG: 2.5 INJECTION, SOLUTION SUBCUTANEOUS at 17:12

## 2018-03-05 RX ADMIN — ACETAMINOPHEN PRN MG: 160 SOLUTION ORAL at 17:11

## 2018-03-05 NOTE — NEUROPROG
Assessment: 


[id number]


-


Neurology Consult:


-


CC: F/U for toxic-metabolic encephalopathy


-


HPI:


Pt found confused at independent living facility on 2/25/18 so he was admitted 

to [facility].  On admission found to have glucose of >800, severe acidemia (VBG pH 

7.14, lactate 4.4), prerenal TIGRE, thrombocytopenia, and hyponatremia in the 

160s.  He was also in respiratory distress and tested positive for influenza.  

There was also concern for possible pneumonia.  It was also noted the patient 

had received haloperidol and lorazepam.  Dr. Sultana (neurology) saw the 

patient on 3/4/18 and felt he likely had toxic-metabolic encephalopathy from 

underlying medical conditions and medications to explain his altered mentation.

  I initially saw the patient on 3/5/18.  He was awake moving all extremities 

but was not attending to his environment and appeared encephalopathic.


-


Labs:


2/25/18- INR 1.14


-


Rads:


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 

2.5 cm


-


Assessment:


1. DKA, Hyponatremia, TIGRE all improved from admission on 2/25/18


2. Influenza with respiratory issues, HCAP vs aspiration


-


3. Toxic-metabolic encephalopathy: Likely his acute medical issues, multiple 

medications used to treat his symptoms, and his underlying reduced cognitive 

reserves have caused him to get a prolonged toxic-metabolic encephalopathy.  

Treatment is supportive.


-


Plan:


- Agree with hospitalist treatment of underlying conditions


- Neurology will continue to follow


-


35 min spent with patient, coordinating care and counseling on prognosis of his 

condition.





Objective: 





 Vital Signs











Temp Pulse Resp BP Pulse Ox


 


 37.8 C   127 H  32 H  129/56 H  92 


 


 03/05/18 12:00  03/05/18 12:26  03/05/18 12:00  03/05/18 12:26  03/05/18 12:00








 Microbiology











 03/04/18 19:00  - Final





 Sputum, Expectorated 








 Laboratory Results





 03/05/18 05:50 





 03/05/18 11:50 





 











 03/04/18 03/05/18 03/06/18





 05:59 05:59 05:59


 


Intake Total 2955.3 3512.1 


 


Output Total 2100 2695 360


 


Balance 855.3 817.1 -360








 











PT  16.9 SEC (12.0-15.0)  H  03/02/18  14:00    


 


INR  1.36  (0.83-1.16)  H  03/02/18  14:00    











Allergies/Adverse Reactions: 


 





Unable to Assess Allergy (Verified 03/03/18 04:32)

## 2018-03-05 NOTE — ASMTCMCOM
CM Note

 

CM Note                       

Notes:

Contacted [facility], [phone] and spoke w/Bernardo Bray RN, who reported that patient had 

been under their care for the past 3yrs and Kaylen Barrera RN had been visiting him 1x/month 


during those years. They knew of his friend, Delfina in [place]. Bernardo reports that Delfina was his 

only friend and she didn't know of any family. Above RN's report that patient wouldn't want to be 

kept alive if he was confused and wouldn't have a meaningful life. Patient also had [employer] [phone] assisting him at home. aLura, the company Director will contact this CM 

Tuesday to confirm patient's thoughts and feelings.

 

Date Signed:  03/05/2018 04:06 PM

Electronically Signed By:Harika Ho LCSW

## 2018-03-06 LAB — PLATELET # BLD: 159 10^3/UL (ref 150–400)

## 2018-03-06 RX ADMIN — METOPROLOL TARTRATE SCH MG: 25 TABLET, FILM COATED ORAL at 08:50

## 2018-03-06 RX ADMIN — LOPERAMIDE HYDROCHLORIDE PRN MG: 1 SOLUTION ORAL at 18:24

## 2018-03-06 RX ADMIN — FONDAPARINUX SODIUM SCH MG: 2.5 INJECTION, SOLUTION SUBCUTANEOUS at 11:08

## 2018-03-06 RX ADMIN — Medication PRN APPLIC: at 22:34

## 2018-03-06 RX ADMIN — FAMOTIDINE SCH MG: 20 TABLET, FILM COATED ORAL at 22:19

## 2018-03-06 RX ADMIN — INSULIN LISPRO SCH UNITS: 100 INJECTION, SOLUTION INTRAVENOUS; SUBCUTANEOUS at 12:30

## 2018-03-06 RX ADMIN — INSULIN LISPRO SCH UNITS: 100 INJECTION, SOLUTION INTRAVENOUS; SUBCUTANEOUS at 22:24

## 2018-03-06 RX ADMIN — OSELTAMIVIR PHOSPHATE SCH MG: 6 POWDER, FOR SUSPENSION ORAL at 22:19

## 2018-03-06 RX ADMIN — ACETAMINOPHEN PRN MG: 160 SOLUTION ORAL at 15:52

## 2018-03-06 RX ADMIN — DEXTROSE MONOHYDRATE SCH MLS: 5 INJECTION, SOLUTION INTRAVENOUS at 05:44

## 2018-03-06 RX ADMIN — INSULIN LISPRO SCH UNITS: 100 INJECTION, SOLUTION INTRAVENOUS; SUBCUTANEOUS at 03:57

## 2018-03-06 RX ADMIN — ACETAMINOPHEN PRN MG: 160 SOLUTION ORAL at 04:00

## 2018-03-06 RX ADMIN — Medication SCH MG: at 22:19

## 2018-03-06 RX ADMIN — Medication PRN APP: at 22:36

## 2018-03-06 RX ADMIN — INSULIN LISPRO SCH UNITS: 100 INJECTION, SOLUTION INTRAVENOUS; SUBCUTANEOUS at 08:58

## 2018-03-06 RX ADMIN — LOPERAMIDE HYDROCHLORIDE PRN MG: 1 SOLUTION ORAL at 15:53

## 2018-03-06 RX ADMIN — DEXMEDETOMIDINE HYDROCHLORIDE SCH MLS: 100 INJECTION, SOLUTION, CONCENTRATE INTRAVENOUS at 22:13

## 2018-03-06 RX ADMIN — DEXTROSE MONOHYDRATE SCH MLS: 5 INJECTION, SOLUTION INTRAVENOUS at 22:18

## 2018-03-06 RX ADMIN — Medication PRN APPLIC: at 17:49

## 2018-03-06 RX ADMIN — OSELTAMIVIR PHOSPHATE SCH MG: 6 POWDER, FOR SUSPENSION ORAL at 08:50

## 2018-03-06 RX ADMIN — METOPROLOL TARTRATE SCH MG: 25 TABLET, FILM COATED ORAL at 22:19

## 2018-03-06 RX ADMIN — DEXTROSE MONOHYDRATE SCH MLS: 5 INJECTION, SOLUTION INTRAVENOUS at 14:30

## 2018-03-06 RX ADMIN — INSULIN LISPRO SCH UNITS: 100 INJECTION, SOLUTION INTRAVENOUS; SUBCUTANEOUS at 16:18

## 2018-03-06 NOTE — HOSPPROG
Hospitalist Progress Note


Assessment/Plan: 





82-year-old gentleman found confused at his independent living apartment with 

elevated blood sugars, now Influenza A positive, condition remains guarded





# acute hypoxic respiratory failure- 92% on 6 LPM - influenza A positive.  CTA 

neg for PE, b/l patchy infiltrates.  CXR this am pers reviewed / interp- 

increasing infiltrates.  MSSA on sputum culture.


   - cont Cefepime and Flagyl for possible HCAP vs aspiration, day 3/7 


   - cont tamiflu per tube


   - continue aggressive pulmonary support


   - aspiration precautions





# Severe sepsis w/ acute hypotension (leukocytosis, tachycardia, elevated 

lactate, acute renal failure) - bcx NGTD x 2 sets, off pressors 


# Influenza A with poss PNA complication - PCT 1.5


   - cont Tamiflu per tube 


   - abtx as above





# tachycardia - Sinus with PAC's / SVT, telemetry personally reviewed and 

interpreted.  Improved with BB.


   - cont metoprolol





# acute thrombocytopenia - platelets 274-> 73-> 159 this am - has received 

heparin during his stay


   - DIC panel with elevated fibrinogen, makes acute DIC unlikely


   - defer heparin


   - HIT pending 





# severe hypernatremia- sodium near 170 on arrival, down to 145 with hypotonic 

fluids, back up to 150 off fluids, now trending down with increased free h20


   - cont free water in tube feeds, currently 200 mLs q2h


   - daily bmp





# severe Hyperglycemia- HHS versus DKA.  BS > 1000 on arrival - now bg's 200's


   - increase Lantus again today to 30 u


   - cont SSI





#acute encephalopathy - persists, likely multifactorial with sodium near 170 

and BS > 1000, ?infection related.  CT brain neg on admission.  Not improving.


   - off precedex 


   - prn haldol for agitation


   - neurology consulted for opinion regarding further imaging vs LP - no 

further workup recommended





#Acute kidney injury - Cr normalized with IVF's





# severe protein calorie malnutrition -Under weight-patient's current weight is 

54.4 kg.  


   - cont TF's





#FEN - tube feeds





#diarrhea - c diff neg. start immodium.  pt has kicked out 2 rectal tubes





#PPX - fondaparinux while awaiting HIT Ab (plts now >100)


#COR - Addressed code status with pt's medical proxy, Delfina Meredith.  Pt now 

DNR.  


#Care goals - pt's condition not improving.  Delfina does not think he would want 

to continue life in this state.  Consider hospice / comfort care if not 

improving in next 1-2 days


Dispo - cont ICU.  He is critically ill requiring close monitoring.  45 minutes 

crit care time.  Considering LTAC placement vs hospice as above.





I have discussed the case with Dr. Quarles and care team


Subjective: Pt remains encephalopathic, grunts, but no meaningful words.  Not 

responding to voice.  No fevers.  +ongoing cough, tachypnea.  tolerating tube 

feeds


Objective: 


 Vital Signs











Temp Pulse Resp BP Pulse Ox


 


 37.4 C   88   30 H  123/61 H  90 L


 


 03/06/18 08:00  03/06/18 11:00  03/06/18 11:00  03/06/18 11:00  03/06/18 11:00








 Microbiology











 03/04/18 19:00  - Final





 Sputum, Expectorated 


 


 02/28/18 15:50 Blood Culture - Final





 Blood 


 


 02/28/18 15:30 Blood Culture - Final





 Blood 








 Laboratory Results





 03/06/18 05:50 





 03/06/18 05:50 





 











 03/05/18 03/06/18 03/07/18





 05:59 05:59 05:59


 


Intake Total 3512.1 3258.2 


 


Output Total 2695 2010 


 


Balance 817.1 1248.2 








 











PT  16.9 SEC (12.0-15.0)  H  03/02/18  14:00    


 


INR  1.36  (0.83-1.16)  H  03/02/18  14:00    














- Physical Exam


Constitutional: chronically ill appearing


Eyes: PERRL


Ears, Nose, Mouth, Throat: moist mucous membranes


Cardiovascular: regular rate and rhythym


Respiratory: no respiratory distress, reduced air movement, inspiratory crackles


Gastrointestinal: normoactive bowel sounds, soft, non-tender abdomen


Skin: warm


Musculoskeletal: generalized weakness


Neurologic: other (agitated)


Psychiatric: encephalopathic





ICD10 Worksheet


Patient Problems: 


 Problems











Problem Status Onset


 


Atrial fibrillation Acute  


 


Hypothermia Acute  


 


Non-ketotic hyperosmolar coma Acute

## 2018-03-06 NOTE — WOCRNPDOC
WOCRN Advanced Assessment Note





- Skin Integrity Problem, Advanced Assess


  ** Forehead Pressure Injury


Dressing Type: Open to Air


Wound Bed Constitution: Healed


Pressure Injury Stage: Stage 1, Medical Device Related Pressure Injury


Pressure Injury Present on Admit: No





  ** Nose Pressure Injury


Dressing Type: Open to Air


Wound Bed Constitution: Scab


Site Measurement - Head-to-Toe Length X Width X Depth (cm): 0.8x0.7xscab


Pressure Injury Stage: Medical Device Related Pressure Injury (Bipap)


Pressure Injury Present on Admit: No


Skin Integrity Problem Comment: Bipap off. Unable to stage due to scabbing. 

Communicated with DONAVON Ansari that dressing must be in place. Will update orders. 

Wound care will continue to follow.





  ** Lower Lip Pressure Injury


Dressing Type: Open to Air


Wound Bed Constitution: Healed


Pressure Injury Stage: Stage 1, Medical Device Related Pressure Injury


Pressure Injury Present on Admit: No





  ** Coccyx


Dressing Type: Open to Air


Exudate Amount: None


Wound Bed Constitution: Red/Pink - Non Granular Tissue (100%)


Site Measurement - Head-to-Toe Length X Width X Depth (cm): 1.8x0.5x0.1


Pressure Injury Stage: Stage 2


Pressure Injury Present on Admit: No


Skin Integrity Problem Comment: Newly opening wound. Wound care will follow. 

Please offload aggressively. Patient extremely high risk due to fecal 

incotinence and immobility.





  ** Bilateral Sacrum Pressure Injury


Dressing Type: Open to Air


Skin Integrity Problem Comment: No change in area since last week. May not be 

pressure related, but hyperpigmentation with some hyperkeratosis. Area will 

declare itsself over the course of the next week.





  ** Scrotum


Dressing Type: Open to Air


Skin Integrity Problem Comment: Moderate to severe IAD with open wounds. 

Continue use of MAD cream.

## 2018-03-06 NOTE — PDINTPN
Intensivist Progress Note


Assessment/Plan: 


Assessment:





Influenza:  Improving.  Likely contributes to hypoxemia, delirium.





Hypoxemic respiratory failure: Due to bilateral pneumonia/atelectasis. 

Multifactorial, with fluid shifts, Influenza, atelectasis, possible aspiration.

  Oxygen stable at 6 L however new infiltrates have developed:  Possibly 

secondary to recurrent aspiration, possibly new staph pneumonia, possibly fluid.

?  Remains on metronidazole and cefepime.





Hypernatremia:  Improving, Sodium 147 today





Hyperglycemia:  Glucoses remain in 200s. On insulin SSI and Lantus.  Will 

increase Lantus again today.





Hemodynamics:  Resolved.  HR normal.





Nutrition: SBFT placed 3/2.  Tolerating TFs.





Tachycradia:  Resolved, still with ectopy.


 


Delirium: Persists, without significant improvement.  Multifactorial:  Toxic 

metabolic.  Neurologic consultation appreciated.  On Precedex at night however 

still sleeping for only a few hours.





Prognosis remains guarded and may be poor.  Not responding to current therapies 

from a cognitive standpoint.  Requiring significant care.  Definitely will not 

be able to go back to an independent living situation unless something changes.

  It may be reasonable to talk about transitioning to comfort care, possibly 

hospice?











Plan:  Continue care in the intensive care unit.  Continue supplemental oxygen.

  Continue Cefepime and Flagyl.  Await sensitivities on Staph aureus comma back 

later today.  Continue fee H2O.  Will restart Lasix and try to get a neutral 

fluid balance over the next few days.  Increase Lantus again and continue 

sliding scale insulin.  Continue Melatonin and Precedex at night for sleep and 

agitation.  Hold Precedex during the day and increase stimulation/orientation 

as possible. Follow Na, lab, CXR.  Readdress prognosis and consider options, 

including LTAC, Hospice, comfort care, etc.











40 min critical care time spent directly with the patient.  Discussed with 

nursing, hospitalist, ICU multi disciplinary team.











Subjective: 





Moaning, looks to voice, nonverbal.  Restless.


Objective: 





 Vital Signs











Temp Pulse Resp BP Pulse Ox


 


 37.4 C   83   24 H  117/61   93 


 


 03/06/18 08:00  03/06/18 08:00  03/06/18 08:00  03/06/18 08:00  03/06/18 08:00








 Microbiology











 02/28/18 15:50 Blood Culture - Final





 Blood 


 


 02/28/18 15:30 Blood Culture - Final





 Blood 


 


 03/04/18 19:00  - Final





 Sputum, Expectorated 








 Laboratory Results





 03/06/18 05:50 





 03/06/18 05:50 





 











 03/05/18 03/06/18 03/07/18





 05:59 05:59 05:59


 


Intake Total 3512.1 3258.2 


 


Output Total 2695 2010 


 


Balance 817.1 1248.2 








 











PT  16.9 SEC (12.0-15.0)  H  03/02/18  14:00    


 


INR  1.36  (0.83-1.16)  H  03/02/18  14:00    











 Laboratory Tests











  03/05/18





  15:30


 


C. difficile Tox (PCR)  NEGATIVE








CXR:  Increased infiltrates in right upper lobe, right lower lobe, somewhat 

retrocardiac.  Possible aspiration?





Microbiology





03/04/18 19:00   Sputum, Expectorated    - Final


03/04/18 19:00   Sputum, Expectorated   Sputum Culture - Preliminary


                              Staphylococcus Aureus











Physical Exam





- Physical Exam


General Appearance: mild distress, thin, other (Restless, looks to voice, not 

responsive to commands)


EENT: PERRL/EOMI, EOM palsy, other (Dry mucous membranes.  Nasal cannula in 

place at 6 L, no change)


Neck: normal inspection (No obvious jugular venous distension)


Respiratory: lungs clear (Anteriorly), decreased breath sounds, rales (Few), 

other (No obvious consolidation), No rhonchi, No wheezing


Cardiac/Chest: regular rate, rhythm, systolic murmur


Abdomen: normal bowel sounds, non-tender, soft, other (Tolerating tube feeding)


Male Genitalia: other (Villalobos catheter in place comma input greater than output 

over the last number of days.)


Skin: warm/dry, pallor


Extremities: pedal edema (Trace)


Neuro/Psych: no motor/sensory deficits (Moves all extremities), cognition 

abnormalities (Obtunded, restless, nonverbal, not following commands)





ICD10 Worksheet


Patient Problems: 


 Problems











Problem Status Onset


 


Non-ketotic hyperosmolar coma Acute  


 


Hypothermia Acute  


 


Atrial fibrillation Acute

## 2018-03-07 LAB — PLATELET # BLD: 187 10^3/UL (ref 150–400)

## 2018-03-07 RX ADMIN — METOPROLOL TARTRATE SCH MG: 25 TABLET, FILM COATED ORAL at 09:12

## 2018-03-07 RX ADMIN — INSULIN LISPRO SCH UNITS: 100 INJECTION, SOLUTION INTRAVENOUS; SUBCUTANEOUS at 00:58

## 2018-03-07 RX ADMIN — DEXTROSE MONOHYDRATE SCH MLS: 5 INJECTION, SOLUTION INTRAVENOUS at 13:33

## 2018-03-07 RX ADMIN — Medication SCH MG: at 20:44

## 2018-03-07 RX ADMIN — FONDAPARINUX SODIUM SCH MG: 2.5 INJECTION, SOLUTION SUBCUTANEOUS at 09:11

## 2018-03-07 RX ADMIN — INSULIN LISPRO SCH UNITS: 100 INJECTION, SOLUTION INTRAVENOUS; SUBCUTANEOUS at 12:35

## 2018-03-07 RX ADMIN — Medication PRN MG: at 20:46

## 2018-03-07 RX ADMIN — HALOPERIDOL LACTATE PRN MG: 5 INJECTION, SOLUTION INTRAMUSCULAR at 09:22

## 2018-03-07 RX ADMIN — HALOPERIDOL LACTATE PRN MG: 5 INJECTION, SOLUTION INTRAMUSCULAR at 23:34

## 2018-03-07 RX ADMIN — ACETAMINOPHEN PRN MG: 160 SOLUTION ORAL at 19:15

## 2018-03-07 RX ADMIN — DEXTROSE MONOHYDRATE SCH MLS: 5 INJECTION, SOLUTION INTRAVENOUS at 21:00

## 2018-03-07 RX ADMIN — INSULIN LISPRO SCH UNITS: 100 INJECTION, SOLUTION INTRAVENOUS; SUBCUTANEOUS at 06:38

## 2018-03-07 RX ADMIN — Medication PRN APP: at 20:47

## 2018-03-07 RX ADMIN — Medication PRN MG: at 13:07

## 2018-03-07 RX ADMIN — INSULIN LISPRO SCH UNITS: 100 INJECTION, SOLUTION INTRAVENOUS; SUBCUTANEOUS at 09:11

## 2018-03-07 RX ADMIN — LOPERAMIDE HYDROCHLORIDE PRN MG: 1 SOLUTION ORAL at 09:43

## 2018-03-07 RX ADMIN — HALOPERIDOL LACTATE PRN MG: 5 INJECTION, SOLUTION INTRAMUSCULAR at 16:50

## 2018-03-07 RX ADMIN — DEXTROSE MONOHYDRATE SCH MLS: 5 INJECTION, SOLUTION INTRAVENOUS at 05:32

## 2018-03-07 RX ADMIN — ACETAMINOPHEN PRN MG: 160 SOLUTION ORAL at 09:22

## 2018-03-07 RX ADMIN — METOPROLOL TARTRATE SCH MG: 25 TABLET, FILM COATED ORAL at 20:45

## 2018-03-07 RX ADMIN — FAMOTIDINE SCH MG: 20 TABLET, FILM COATED ORAL at 20:44

## 2018-03-07 RX ADMIN — LOPERAMIDE HYDROCHLORIDE PRN MG: 1 SOLUTION ORAL at 11:55

## 2018-03-07 RX ADMIN — Medication PRN APPLIC: at 20:46

## 2018-03-07 RX ADMIN — INSULIN LISPRO SCH UNITS: 100 INJECTION, SOLUTION INTRAVENOUS; SUBCUTANEOUS at 20:50

## 2018-03-07 RX ADMIN — INSULIN GLARGINE SCH UNITS: 100 INJECTION, SOLUTION SUBCUTANEOUS at 09:11

## 2018-03-07 RX ADMIN — HALOPERIDOL LACTATE PRN MG: 5 INJECTION, SOLUTION INTRAMUSCULAR at 13:09

## 2018-03-07 RX ADMIN — INSULIN LISPRO SCH: 100 INJECTION, SOLUTION INTRAVENOUS; SUBCUTANEOUS at 16:30

## 2018-03-07 NOTE — ASMTCMCOM
CM Note

 

CM Note                       

Notes:

A family meeting has been set  up for tomorrow 3-8-2018 at 12:30 with patient's MDPOA, Delfina. Delfina 


will be on a telephone conference since she lives in [place], [place]. CM will follow.

 

Date Signed:  03/07/2018 04:29 PM

Electronically Signed By:Julieth Pablo LCSW

## 2018-03-07 NOTE — PDINTPN
Intensivist Progress Note


Assessment/Plan: 


Assessment:





Influenza:  Improved/resolved.  Likely has contributed to hypoxemia and delirium

, but other factors now involved.





Hypoxemic respiratory failure: Due to bilateral pneumonia/atelectasis. 

Multifactorial, with fluid shifts, Influenza, atelectasis, possible aspiration.

  Oxygen stable at 6 L however new infiltrates have developed:  Possibly 

secondary to recurrent aspiration, possibly new MSSA pneumonia, but also 

growing a Pseudomonas organism, collapse, and Serratia.  Awaiting sensitivities 

however these all may be colonizing organisms.  Infiltrates possibly related to 

fluid as well as input significantly had a output over the past number of days.

  Remains on metronidazole and cefepime.





Hypernatremia:  Resolved, Sodium 142 today





Hyperglycemia:  Glucoses better on current regimen of increased Lantus and 

sliding scale coverage..





Hemodynamics:  Resolved hypotension.  HR normal.





Nutrition: SBFT placed 3/2.  Tolerating TFs.





Tachycradia:  Resolved, still with some ectopy.


 


Delirium/AMS: Persists, without significant improvement.  Multifactorial:  

Toxic metabolic, medications.  Possible diffuse neuro injury secondary to 

prolonged hypoglycemia?  Neurologic consultation appreciated.  On Precedex at 

night however still sleeping for only a few hours.





Prognosis remains guarded and may be poor.  Not responding to current therapies 

from a cognitive standpoint.  Requiring significant care and appears to be 

suffering to some extent.  Definitely will not be able to go back to an 

assisted living unless something changes.  It may be reasonable to transition 

to comfort care, possibly Hospice?











Plan:  Continue care in the intensive care unit.  Continue supplemental oxygen.

  Continue Cefepime and Flagyl.  Await sensitivities on multiple organisms from 

his sputum culture, but I suspect contamination.  Continue fee H2O for now, 

follow sodium.  Will continue Lasix at increased doses if blood pressure and 

renal function tolerate.  I would like to try to get a neutral fluid balance 

over the next few days.  Continue Lantus again and continue sliding scale 

insulin.  Continue Melatonin and Precedex at night for sleep and agitation.  

Hold Precedex during the day and increase stimulation/orientation as possible. 

Follow lab, CXR. 





Readdress prognosis and consider options, including LTAC, Hospice, comfort care

, etc. tomorrow











40 min critical care time spent directly with the patient.  Discussed with 

nursing, hospitalist, ICU multi disciplinary team.














Subjective: 





Moaning, restless, obtunded.  Looks to voice.  Does not follow commands.  

Nonverbal.


Objective: 





 Vital Signs











Temp Pulse Resp BP Pulse Ox


 


 37.7 C   92   22 H  137/51 H  94 


 


 03/07/18 16:00  03/07/18 16:00  03/07/18 16:00  03/07/18 16:00  03/07/18 16:00








 Microbiology











 03/04/18 19:00  - Final





 Sputum, Expectorated 








 Laboratory Results





 03/07/18 05:25 





 03/07/18 12:30 





 











 03/06/18 03/07/18 03/08/18





 05:59 05:59 05:59


 


Intake Total 3258.2 4238 516


 


Output Total 2010 3050 1100


 


Balance 1248.2 1188 -584








 











PT  16.9 SEC (12.0-15.0)  H  03/02/18  14:00    


 


INR  1.36  (0.83-1.16)  H  03/02/18  14:00    








CXR:  Infiltrates on the right are somewhat better today.





Physical Exam





- Physical Exam


General Appearance: mild distress (Restless, moaning), thin


EENT: EOM palsy (Dysconjugate gaze), other (Nasal cannula at 6 L:  94%.  Stable.

)


Neck: normal inspection (No JVD)


Respiratory: decreased breath sounds (Bilaterally), rales (Rales present at 

bases), No rhonchi, No wheezing


Cardiac/Chest: regular rate, rhythm, systolic murmur, No gallop


Abdomen: normal bowel sounds, non-tender, soft, other (Tolerating tube feeding)


Male Genitalia: other (Villalobos catheter in place.  Adequate urine output however 

input greater than output over the last number of days.)


Skin: warm/dry, pallor


Extremities: pedal edema (Trace +)


Neuro/Psych: no motor/sensory deficits (Moves all extremities equally), No 

cognition abnormalities (Looks to voice only, not following commands)





ICD10 Worksheet


Patient Problems: 


 Problems











Problem Status Onset


 


Non-ketotic hyperosmolar coma Acute  


 


Hypothermia Acute  


 


Atrial fibrillation Acute

## 2018-03-07 NOTE — NEUROPROG
Assessment: 


[id number]


-


Neurology Consult:


-


CC: F/U for toxic-metabolic encephalopathy


-


Narrative Summary:


Pt found confused at independent living facility on 2/25/18 so he was admitted 

to [facility].  On admission found to have glucose of >800, severe acidemia (VBG pH 

7.14, lactate 4.4), prerenal TIGRE, thrombocytopenia, and hyponatremia in the 

160s.  He was also in respiratory distress and tested positive for influenza.  

There was also concern for possible pneumonia.  It was also noted the patient 

had received haloperidol and lorazepam.  Dr. Sultana (neurology) saw the 

patient on 3/4/18 and felt he likely had toxic-metabolic encephalopathy from 

underlying medical conditions and medications to explain his altered mentation.

  I initially saw the patient on 3/5/18.  He was awake moving all extremities 

but was not attending to his environment and appeared encephalopathic.


-


HPI:


F/U 3/7/18.  Still no significant improvement of suspected toxic-metabolic 

encephalopathy (TME).  I spoke with his nursing with the possibility of 

obtaining a brain MRI w/ and w/o con followed by a spinal tap if unremarkable.  

It was felt the patient would likely need significant sedation to perform these 

studies which can worsen toxic metabolic encephalopathy, increase risk for 

aspiration (pt with known pulmonary issues and infection), and it did not seem 

likely to .  Pt was on broad spectrum antibiotics.  He had no 

meningismus (I was able to touch his chin to his chest without him seeming to 

be in pain) and I do not suspect the brain MRI or spinal tap will change 

management.  At this time I recommend treating his known infection and Sepsis (

pt has influenza) and see if when the infection resolves if his mentation 

improves.  


-


Labs:


2/25/18- INR 1.14


-


Rads:


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 

2.5 cm


-


Assessment:


1. DKA, Hyponatremia, TIGRE on admission on 2/25/18


2. Influenza with respiratory issues, pneumonia, Sepsis


-


3. Toxic-metabolic encephalopathy: Likely his acute medical issues, multiple 

medications used to treat his symptoms, and his underlying reduced cognitive 

reserves have caused him to get a prolonged toxic-metabolic encephalopathy.  

Treatment is supportive.  I spoke with his nursing with the possibility of 

obtaining a brain MRI w/ and w/o con followed by a spinal tap if unremarkable.  

It was felt the patient would likely need significant sedation to perform these 

studies which can worsen toxic metabolic encephalopathy, increase risk for 

aspiration (pt with known pulmonary issues and infection), and it did not seem 

likely to .  Pt was on broad spectrum antibiotics.  He had no 

meningismus (I was able to touch his chin to his chest without him seeming to 

be in pain) and I do not suspect the brain MRI or spinal tap will change 

management.  At this time I recommend treating his known infection and Sepsis (

pt has influenza) and see if when the infection resolves if his mentation 

improves.  


-


Plan:


- Agree with hospitalist treatment of underlying conditions


- Neurology will continue to follow


-


35 min spent with patient and nurse, coordinating care and determining 

treatment and diagnostic steps.





Objective: 





 Vital Signs











Temp Pulse Resp BP Pulse Ox


 


 37.2 C   85   26 H  114/78   91 L


 


 03/07/18 08:00  03/07/18 08:00  03/07/18 08:00  03/07/18 08:00  03/07/18 08:00








 Microbiology











 03/04/18 19:00  - Final





 Sputum, Expectorated 


 


 02/28/18 15:50 Blood Culture - Final





 Blood 


 


 02/28/18 15:30 Blood Culture - Final





 Blood 








 Laboratory Results





 03/07/18 05:25 





 03/07/18 05:25 





 











 03/06/18 03/07/18 03/08/18





 05:59 05:59 05:59


 


Intake Total 3258.2 4238 


 


Output Total 2010 3050 


 


Balance 1248.2 1188 








 











PT  16.9 SEC (12.0-15.0)  H  03/02/18  14:00    


 


INR  1.36  (0.83-1.16)  H  03/02/18  14:00    











Allergies/Adverse Reactions: 


 





Unable to Assess Allergy (Verified 03/03/18 04:32)

## 2018-03-07 NOTE — HOSPPROG
Hospitalist Progress Note


Assessment/Plan: 





82-year-old gentleman found confused at his independent living apartment with 

elevated blood sugars, now Influenza A positive, condition remains guarded





# acute hypoxic respiratory failure- 92% on 6 LPM - influenza A positive.  CTA 

neg for PE, b/l patchy infiltrates.  CXR this am pers reviewed / interp- 

increasing infiltrates.  MSSA on sputum culture.


   - cont Cefepime and Flagyl for possible HCAP vs aspiration, day 4/7 


   - cont tamiflu per tube


   - continue aggressive pulmonary support


   - aspiration precautions





#acute encephalopathy - persists, likely multifactorial but suspect that 

medications may be playing large role at this point


* on precedex overnight


* will markedly decrease ativan


* increase haldol


* small amount of pain meds





# Severe sepsis w/ acute hypotension (leukocytosis, tachycardia, elevated 

lactate, acute renal failure) - bcx NGTD x 2 sets, off pressors 


# Influenza A with poss PNA complication - PCT 1.5


   - cont Tamiflu per tube 


   - abtx as above





# tachycardia - Sinus with PAC's / SVT, telemetry personally reviewed and 

interpreted.  Improved with BB.


   - cont metoprolol





# acute thrombocytopenia - platelets 274-> 73-> 159 this am - has received 

heparin during his stay


* HIT negative


* restart lovenox





# severe hypernatremia- sodium near 170 on arrival


* resolved





# severe Hyperglycemia- HHS versus DKA.  BS > 1000 on arrival - now bg's 200's


* cont lantus and SSI








#Acute kidney injury - resolved





# severe protein calorie malnutrition -Under weight-patient's current weight is 

54.4 kg.  


   - cont TF's





#FEN - tube feeds





#diarrhea - c diff neg - immodium





#PPX - lovenox


#COR - Now DNR


#Care goals - pt's condition not improving.  Delfina does not think he would want 

to continue life in this state.  Consider hospice / comfort care if not 

improving in next 1-2 days


Dispo - cont ICU.  He is critically ill requiring close monitoring.


I have discussed the case with Dr. Quarles and care team


Subjective: not much change in mental status


Objective: 


 Vital Signs











Temp Pulse Resp BP Pulse Ox


 


 37.2 C   74   21 H  90/37 L  93 


 


 03/07/18 08:00  03/07/18 10:00  03/07/18 10:00  03/07/18 10:00  03/07/18 10:00








 Microbiology











 03/04/18 19:00  - Final





 Sputum, Expectorated 


 


 02/28/18 15:50 Blood Culture - Final





 Blood 


 


 02/28/18 15:30 Blood Culture - Final





 Blood 








 Laboratory Results





 03/07/18 05:25 





 03/07/18 05:25 





 











 03/06/18 03/07/18 03/08/18





 05:59 05:59 05:59


 


Intake Total 3258.2 4238 


 


Output Total 2010 3050 


 


Balance 1248.2 1188 








 











PT  16.9 SEC (12.0-15.0)  H  03/02/18  14:00    


 


INR  1.36  (0.83-1.16)  H  03/02/18  14:00    














- Physical Exam


Constitutional: no apparent distress, other (agitiated)


Cardiovascular: regular rate and rhythym, no murmur, rub, or gallop


Respiratory: no respiratory distress, rhonchi


Gastrointestinal: normoactive bowel sounds, soft, non-tender abdomen, no 

palpable masses


Skin: warm


Neurologic: No AAOx3


Psychiatric: agitated





ICD10 Worksheet


Patient Problems: 


 Problems











Problem Status Onset


 


Atrial fibrillation Acute  


 


Hypothermia Acute  


 


Non-ketotic hyperosmolar coma Acute

## 2018-03-08 LAB — PLATELET # BLD: 249 10^3/UL (ref 150–400)

## 2018-03-08 RX ADMIN — ENOXAPARIN SODIUM SCH MG: 100 INJECTION SUBCUTANEOUS at 08:08

## 2018-03-08 RX ADMIN — DEXTROSE MONOHYDRATE SCH MLS: 5 INJECTION, SOLUTION INTRAVENOUS at 15:11

## 2018-03-08 RX ADMIN — INSULIN LISPRO SCH UNITS: 100 INJECTION, SOLUTION INTRAVENOUS; SUBCUTANEOUS at 17:01

## 2018-03-08 RX ADMIN — INSULIN LISPRO SCH: 100 INJECTION, SOLUTION INTRAVENOUS; SUBCUTANEOUS at 01:17

## 2018-03-08 RX ADMIN — HALOPERIDOL LACTATE PRN MG: 5 INJECTION, SOLUTION INTRAMUSCULAR at 15:10

## 2018-03-08 RX ADMIN — ACETAMINOPHEN PRN MG: 160 SOLUTION ORAL at 08:35

## 2018-03-08 RX ADMIN — INSULIN GLARGINE SCH UNITS: 100 INJECTION, SOLUTION SUBCUTANEOUS at 08:08

## 2018-03-08 RX ADMIN — DEXTROSE MONOHYDRATE SCH MLS: 5 INJECTION, SOLUTION INTRAVENOUS at 21:27

## 2018-03-08 RX ADMIN — DEXTROSE MONOHYDRATE SCH MLS: 5 INJECTION, SOLUTION INTRAVENOUS at 05:00

## 2018-03-08 RX ADMIN — INSULIN LISPRO SCH UNITS: 100 INJECTION, SOLUTION INTRAVENOUS; SUBCUTANEOUS at 23:03

## 2018-03-08 RX ADMIN — INSULIN LISPRO SCH: 100 INJECTION, SOLUTION INTRAVENOUS; SUBCUTANEOUS at 13:19

## 2018-03-08 RX ADMIN — INSULIN LISPRO SCH UNITS: 100 INJECTION, SOLUTION INTRAVENOUS; SUBCUTANEOUS at 08:08

## 2018-03-08 RX ADMIN — METOPROLOL TARTRATE SCH MG: 25 TABLET, FILM COATED ORAL at 21:27

## 2018-03-08 RX ADMIN — Medication PRN APPLIC: at 15:10

## 2018-03-08 RX ADMIN — Medication PRN APP: at 07:55

## 2018-03-08 RX ADMIN — ACETAMINOPHEN PRN MG: 160 SOLUTION ORAL at 17:26

## 2018-03-08 RX ADMIN — LOPERAMIDE HYDROCHLORIDE PRN MG: 1 SOLUTION ORAL at 08:35

## 2018-03-08 RX ADMIN — FAMOTIDINE SCH MG: 20 TABLET, FILM COATED ORAL at 21:27

## 2018-03-08 RX ADMIN — METOPROLOL TARTRATE SCH MG: 25 TABLET, FILM COATED ORAL at 10:53

## 2018-03-08 RX ADMIN — HALOPERIDOL LACTATE PRN MG: 5 INJECTION, SOLUTION INTRAMUSCULAR at 04:56

## 2018-03-08 RX ADMIN — HALOPERIDOL LACTATE PRN MG: 5 INJECTION, SOLUTION INTRAMUSCULAR at 07:55

## 2018-03-08 RX ADMIN — Medication SCH MG: at 21:27

## 2018-03-08 RX ADMIN — FUROSEMIDE SCH MG: 10 INJECTION, SOLUTION INTRAMUSCULAR; INTRAVENOUS at 15:11

## 2018-03-08 RX ADMIN — INSULIN LISPRO SCH UNITS: 100 INJECTION, SOLUTION INTRAVENOUS; SUBCUTANEOUS at 04:37

## 2018-03-08 NOTE — HOSPPROG
Hospitalist Progress Note


Assessment/Plan: 





82-year-old gentleman found confused at his independent living apartment with 

elevated blood sugars, now Influenza A positive, condition remains guarded





# acute hypoxic respiratory failure- 92% on 6 LPM - influenza A positive.  CTA 

neg for PE, b/l patchy infiltrates.  CXR this am pers reviewed / interp- 

increasing infiltrates.  MSSA on sputum culture.


   - cont Cefepime and Flagyl for possible HCAP vs aspiration, day 5/7


   - continue aggressive pulmonary support


   - aspiration precautions





#acute encephalopathy - persists, likely multifactorial but suspect that 

medications may be playing large role at this point


* was on fairly significant amounts of ativan prior to yesterday


* would like to see if he clears off Ativan


* didn't sleep overnight with zyprexa 2.5 mg - will try 5 mg


* Zyprexa 2.5 mg today seemed to sedate him too much - will dc





# Severe sepsis w/ acute hypotension (leukocytosis, tachycardia, elevated 

lactate, acute renal failure) - bcx NGTD x 2 sets, off pressors 


# Influenza A with poss PNA complication - PCT 1.5


   - cont Tamiflu per tube 


   - abtx as above





# tachycardia - Sinus with PAC's / SVT, telemetry personally reviewed and 

interpreted.  Improved with BB.


   - cont metoprolol


   - heart rate is better when not agitated





# acute thrombocytopenia - platelets 274-> 73-> 159 this am - has received 

heparin during his stay


* HIT negative


* restart lovenox





# severe hypernatremia- sodium near 170 on arrival


* resolved





# severe Hyperglycemia- HHS versus DKA.  BS > 1000 on arrival - now bg's 200's


* cont lantus and SSI








#Acute kidney injury - resolved





# severe protein calorie malnutrition -Under weight-patient's current weight is 

54.4 kg.  


   - cont TF's





#FEN - tube feeds





#diarrhea - c diff neg - immodium





#PPX - lovenox


#COR - Now DNR


#Care goals - pt's condition not improving.  However I believe that 

encephalopathy could be explained by medications. In discussion with medical 

proxy Delfina - pt did not have dementia at baseline and was living independently 

although he was close to needing assisted living.  Would like to give him a 

couple days off Ativan to see if he clears before transitioning to hospice/

comfort care


I have discussed the case with Dr. Quarles and care team


Subjective: didn't sleep well overnight. agitated this morning but better with 

am zyprexa


Objective: 


 Vital Signs











Temp Pulse Resp BP Pulse Ox


 


 37.3 C   80   20   113/61   93 


 


 03/08/18 11:45  03/08/18 11:45  03/08/18 11:45  03/08/18 11:45  03/08/18 11:45








 Microbiology











 03/04/18 19:00  - Final





 Sputum, Expectorated Sputum Culture - Final





    Staphylococcus Aureus





    Klebsiella Pneumoniae





    Serratia Marcescens





    Pseudomonas Aeruginosa








 Laboratory Results





 03/08/18 04:20 





 03/08/18 04:20 





 











 03/07/18 03/08/18 03/09/18





 05:59 05:59 05:59


 


Intake Total 4238 2825 


 


Output Total 3050 2350 


 


Balance 1188 475 








 











PT  16.9 SEC (12.0-15.0)  H  03/02/18  14:00    


 


INR  1.36  (0.83-1.16)  H  03/02/18  14:00    














- Time Spent With Patient


Time Spent with Patient: greater than 35 minutes (discussion of end of life 

goals)


Time Spent with Patient: Greater than 35 minutes spent on this patients care, 

greater than 50% of time spent counseling, educating, and coordinating care 

regarding the above mentioned plan.





- Physical Exam


Constitutional: no apparent distress, appears nourished, not in pain


Eyes: anicteric sclera, EOMI


Cardiovascular: regular rate and rhythym, no murmur, rub, or gallop


Respiratory: no respiratory distress, no rales or rhonchi


Gastrointestinal: normoactive bowel sounds, soft, non-tender abdomen, no 

palpable masses


Neurologic: other, No AAOx3


Psychiatric: encephalopathic, agitated





ICD10 Worksheet


Patient Problems: 


 Problems











Problem Status Onset


 


Atrial fibrillation Acute  


 


Hypothermia Acute  


 


Non-ketotic hyperosmolar coma Acute

## 2018-03-08 NOTE — PDINTPN
Intensivist Progress Note


Assessment/Plan: 


Assessment:





Influenza:  Improved/resolved.  Likely has contributed to hypoxemia and delirium

, but other factors now involved.





Hypoxemia/pneumonia/respiratory failure:  Bilateral infiltrates/atelectasis 

present on admission. Multifactorial, with fluid shifts, Influenza, atelectasis

, possible aspiration.  Oxygen stable at 6 L, however new infiltrates have 

developed 3/5:  Possibly secondary to recurrent aspiration, possibly new MSSA 

pneumonia, but also growing a Pseudomonas organism (sensitive to cefepime), 

Proteus, and Serratia.  Awaiting further sensitivities.  Infiltrates possibly 

related to fluid as well as input significantly ahead output over the past 

number of days. 





Hypernatremia:  Resolved, last Sodium 142. Follow intermittently





Hyperglycemia:  Glucoses will control on current regimen of increased Lantus 

and sliding scale coverage..





Hemodynamics:  Resolved hypotension.  





Nutrition: SBFT placed 3/2.  Tolerating TFs.





Tachycradia:  Sinus, low 100s to regular rate and rhythm.


 


Delirium/AMS: Persists, without significant improvement.  Multifactorial:  

Toxic metabolic, medications.  Possible diffuse neuro injury secondary to 

prolonged hyperglycemia?  Neurologic consultation appreciated.  Has been on 

Precedex at night, low-dose intermittent Ativan, and now Haldol in p.r.n. 

Dilaudid.





Prognosis remains guarded and may be poor.  Not responding to current therapies 

from a cognitive standpoint.  Requiring significant care and appears to be 

suffering to some extent.  Definitely will not be able to go back to an 

assisted living unless something changes.  It may be reasonable to transition 

to comfort care, possibly Hospice, but medication adjustments in continued 

observation felt to be indicated at this time.











Plan:  Continue care in the intensive care unit.  Continue supplemental oxygen.

  Continue Cefepime and Flagyl.  Await further sensitivities on multiple 

organisms from his sputum culture, but I suspect contamination.  Continue fee 

H2O for now, follow sodium.  Will continue Lasix if blood pressure and renal 

function tolerate.  I would like to try to get a neutral fluid balance over the 

next few days.  Continue Lantus and sliding scale insulin.  Precedex and Ativan 

to be discontinued altogether.  Follow lab, CXR. 





Readdress prognosis and consider options, including LTAC, Hospice, comfort care

, etc.  On a daily basis.  Will wait several days, perhaps early next week, to 

decide if sedating medications possibly have been playing a role.





 did discuss issues with the patient's power of  today.











30 min critical care time spent directly with the patient.  Discussed with 

nursing, hospitalist, ICU multi disciplinary team.














Subjective: 





No changes last 24 hr.  Looks to voice.  Unresponsive.


Objective: 





 Vital Signs











Temp Pulse Resp BP Pulse Ox


 


 37.3 C   80   20   113/61   93 


 


 03/08/18 11:45  03/08/18 11:45  03/08/18 11:45  03/08/18 11:45  03/08/18 11:45








 Microbiology











 03/04/18 19:00  - Final





 Sputum, Expectorated Sputum Culture - Final





    Staphylococcus Aureus





    Klebsiella Pneumoniae





    Serratia Marcescens





    Pseudomonas Aeruginosa








 Laboratory Results





 03/08/18 04:20 





 03/08/18 04:20 





 











 03/07/18 03/08/18 03/09/18





 05:59 05:59 05:59


 


Intake Total 4238 2825 


 


Output Total 3050 2350 


 


Balance 1188 475 








 











PT  16.9 SEC (12.0-15.0)  H  03/02/18  14:00    


 


INR  1.36  (0.83-1.16)  H  03/02/18  14:00    











 Laboratory Tests











  03/08/18 03/08/18





  04:20 06:06


 


pCO2   22 L


 


pO2   66


 


ABG pH   7.45


 


Total O2 Concentration   6.0


 


Ammonia  13.0 














Physical Exam





- Physical Exam


General Appearance: unresponsive (Looks to voice, nonverbal, won't follow 

commands were nod head), thin, No WD/WN (Unchanged)


EENT: PERRL/EOMI, other (annula at 6 L comma NG in place)


Neck: normal inspection (No JVD)


Respiratory: lungs clear, decreased breath sounds (At bases), rales (Few, 

nonspecific), No respiratory distress, No rhonchi, No wheezing


Cardiac/Chest: tachycardia (Sinus, systolic murmur), No gallop


Abdomen: normal bowel sounds, non-tender, soft, other (Tolerating tube feeding)


Male Genitalia: other (Villalobos catheter in place.  Input greater than output but 

less so today)


Skin: normal color


Extremities: pedal edema (Trace +)


Neuro/Psych: no motor/sensory deficits (Moves all extremities equally), 

cognition abnormalities (Unresponsive as noted above)





ICD10 Worksheet


Patient Problems: 


 Problems











Problem Status Onset


 


Non-ketotic hyperosmolar coma Acute  


 


Hypothermia Acute  


 


Atrial fibrillation Acute

## 2018-03-08 NOTE — NEUROPROG
Assessment: 


[id number]


-


Neurology Consult:


-


CC: F/U for toxic-metabolic encephalopathy


-


Narrative Summary:


Pt found confused at independent living facility on 2/25/18 so he was admitted 

to [facility].  On admission found to have glucose of >800, severe acidemia (VBG pH 

7.14, lactate 4.4), prerenal TIGRE, thrombocytopenia, and hyponatremia in the 

160s.  He was also in respiratory distress and tested positive for influenza.  

There was also concern for possible pneumonia.  It was also noted the patient 

had received haloperidol and lorazepam.  Dr. Sultana (neurology) saw the 

patient on 3/4/18 and felt he likely had toxic-metabolic encephalopathy from 

underlying medical conditions and medications to explain his altered mentation.

  I initially saw the patient on 3/5/18.  He was awake moving all extremities 

but was not attending to his environment and appeared encephalopathic.


-


F/U 3/7/18.  Still no significant improvement of suspected toxic-metabolic 

encephalopathy (TME).  I spoke with his nursing with the possibility of 

obtaining a brain MRI w/ and w/o con followed by a spinal tap if unremarkable.  

It was felt the patient would likely need significant sedation to perform these 

studies which can worsen toxic metabolic encephalopathy, increase risk for 

aspiration (pt with known pulmonary issues and infection), and it did not seem 

likely to .  Pt was on broad spectrum antibiotics.  He had no 

meningismus (I was able to touch his chin to his chest without him seeming to 

be in pain) and I do not suspect the brain MRI or spinal tap will change 

management.  At this time I recommend treating his known infection and Sepsis (

pt has influenza) and see if when the infection resolves if his mentation 

improves.  


-


HPI:


F/U 3/8/18.  Pt laying in bed sleeping.  No significant events reported 

overnight.


-


Labs:


2/25/18- INR 1.14


-


Rads:


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 

2.5 cm


-


Assessment:


1. DKA, Hyponatremia, TIGRE on admission on 2/25/18


2. Influenza with respiratory issues, pneumonia, Sepsis


-


3. Toxic-metabolic encephalopathy: Likely his acute medical issues, multiple 

medications used to treat his symptoms, and his underlying reduced cognitive 

reserves have caused him to get a prolonged toxic-metabolic encephalopathy.  

Treatment is supportive.  I spoke with his nursing with the possibility of 

obtaining a brain MRI w/ and w/o con followed by a spinal tap if unremarkable.  

It was felt the patient would likely need significant sedation to perform these 

studies which can worsen toxic metabolic encephalopathy, increase risk for 

aspiration (pt with known pulmonary issues and infection), and it did not seem 

likely to .  Pt was on broad spectrum antibiotics.  He had no 

meningismus (I was able to touch his chin to his chest without him seeming to 

be in pain) and I do not suspect the brain MRI or spinal tap will change 

management.  At this time I recommend treating his known infection and Sepsis (

pt has influenza) and see if when the infection resolves if his mentation 

improves.  


-


Plan:


- Agree with hospitalist treatment of underlying conditions


- Neurology will sign off but will be happy to become involved if needed


-


35 min spent with patient coordinating care and reviewing medical records and 

evaluating patient.





Objective: 





 Vital Signs











Temp Pulse Resp BP Pulse Ox


 


 37.7 C   105 H  27 H  80/59 L  95 


 


 03/08/18 08:00  03/08/18 08:00  03/08/18 08:00  03/08/18 08:00  03/08/18 08:00








 Microbiology











 03/04/18 19:00  - Final





 Sputum, Expectorated Sputum Culture - Final





    Staphylococcus Aureus





    Klebsiella Pneumoniae





    Serratia Marcescens





    Pseudomonas Aeruginosa








 Laboratory Results





 03/08/18 04:20 





 03/08/18 04:20 





 











 03/07/18 03/08/18 03/09/18





 05:59 05:59 05:59


 


Intake Total 4238 2825 


 


Output Total 3050 2350 


 


Balance 1188 475 








 











PT  16.9 SEC (12.0-15.0)  H  03/02/18  14:00    


 


INR  1.36  (0.83-1.16)  H  03/02/18  14:00    











Allergies/Adverse Reactions: 


 





Unable to Assess Allergy (Verified 03/03/18 04:32)

## 2018-03-08 NOTE — ASMTCMCOM
CM Note

 

CM Note                       

Notes:

Consulted with Dr. Quarles and Dr. Oquendo regarding recommendations for going forward with patient's 

medical care. Dr. Oquendo to make some medication changes to determine if their might be some 

improvement in patient's cognitive abilities. Comfort measures vs. more medical intervention is 

being considered. Family meeting was held with Delfina, patient's MDPOA, Marcy Gunderson, , and myself. We informed Delfina of Dr. Oquendo making some medication changes to 

see if patient cognitively improves. Delfina states patient would want quality of life and has only a 


few things left he enjoys. Patient enjoys reading, intellectual conversation and his home 

caregivers from [facility] who have been involved with the patient for the past 3 

years. Currently, d/c plan may be hospice support or SNF placement. We will contact Delfina on Monday 


to update and make decisions. Delfina requested Dr. Oquendo give her a call about the medication 

changes. This request was passed on to Dr. Oquendo. CM will follow.

 

Date Signed:  03/08/2018 02:06 PM

Electronically Signed By:Julieth Pablo LCSW

## 2018-03-08 NOTE — WOCRNPDOC
WOCRN Advanced Assessment Note





- Skin Integrity Problem, Advanced Assess





  ** Nose Pressure Injury


Dressing Type: Allevyn Life


Wound Bed Constitution: Scab


Skin Integrity Problem Comment: no change in wound measurements. Area still 

scabbed and dry. Unable to stage. Please keep wound moist.  Discussed with 

Monica RAPHAEL. Silvasorb applied to wound bed. Wound care will round again next week.





  ** Scrotum


Dressing Type: Open to Air


Bebe Wound Tissue: Erythema, Painful/Tender


Skin Integrity Problem Comment: Improved since previous assessment. Continue 

with plan of care.

## 2018-03-09 LAB — PLATELET # BLD: 268 10^3/UL (ref 150–400)

## 2018-03-09 RX ADMIN — DEXTROSE MONOHYDRATE SCH MLS: 5 INJECTION, SOLUTION INTRAVENOUS at 21:33

## 2018-03-09 RX ADMIN — ACETAMINOPHEN PRN MG: 160 SOLUTION ORAL at 10:11

## 2018-03-09 RX ADMIN — LOPERAMIDE HYDROCHLORIDE SCH MG: 1 SOLUTION ORAL at 21:30

## 2018-03-09 RX ADMIN — FAMOTIDINE SCH MG: 20 TABLET, FILM COATED ORAL at 21:30

## 2018-03-09 RX ADMIN — INSULIN LISPRO SCH UNITS: 100 INJECTION, SOLUTION INTRAVENOUS; SUBCUTANEOUS at 02:05

## 2018-03-09 RX ADMIN — FUROSEMIDE SCH MG: 10 INJECTION, SOLUTION INTRAMUSCULAR; INTRAVENOUS at 09:56

## 2018-03-09 RX ADMIN — INSULIN GLARGINE SCH UNITS: 100 INJECTION, SOLUTION SUBCUTANEOUS at 09:58

## 2018-03-09 RX ADMIN — METOPROLOL TARTRATE SCH MG: 25 TABLET, FILM COATED ORAL at 21:30

## 2018-03-09 RX ADMIN — Medication PRN MG: at 14:31

## 2018-03-09 RX ADMIN — METOPROLOL TARTRATE SCH MG: 25 TABLET, FILM COATED ORAL at 09:56

## 2018-03-09 RX ADMIN — INSULIN LISPRO SCH UNITS: 100 INJECTION, SOLUTION INTRAVENOUS; SUBCUTANEOUS at 09:55

## 2018-03-09 RX ADMIN — Medication PRN APPLIC: at 16:52

## 2018-03-09 RX ADMIN — LOPERAMIDE HYDROCHLORIDE SCH MG: 1 SOLUTION ORAL at 14:06

## 2018-03-09 RX ADMIN — INSULIN LISPRO SCH: 100 INJECTION, SOLUTION INTRAVENOUS; SUBCUTANEOUS at 16:47

## 2018-03-09 RX ADMIN — Medication SCH MG: at 21:30

## 2018-03-09 RX ADMIN — INSULIN LISPRO SCH UNITS: 100 INJECTION, SOLUTION INTRAVENOUS; SUBCUTANEOUS at 14:05

## 2018-03-09 RX ADMIN — LOPERAMIDE HYDROCHLORIDE SCH MG: 1 SOLUTION ORAL at 16:51

## 2018-03-09 RX ADMIN — DEXTROSE MONOHYDRATE SCH MLS: 5 INJECTION, SOLUTION INTRAVENOUS at 07:18

## 2018-03-09 RX ADMIN — INSULIN LISPRO SCH UNITS: 100 INJECTION, SOLUTION INTRAVENOUS; SUBCUTANEOUS at 07:17

## 2018-03-09 RX ADMIN — ENOXAPARIN SODIUM SCH MG: 100 INJECTION SUBCUTANEOUS at 09:55

## 2018-03-09 RX ADMIN — ACETAMINOPHEN PRN MG: 160 SOLUTION ORAL at 16:52

## 2018-03-09 RX ADMIN — INSULIN LISPRO SCH UNITS: 100 INJECTION, SOLUTION INTRAVENOUS; SUBCUTANEOUS at 21:39

## 2018-03-09 RX ADMIN — DEXTROSE MONOHYDRATE SCH MLS: 5 INJECTION, SOLUTION INTRAVENOUS at 14:06

## 2018-03-09 NOTE — HOSPPROG
Hospitalist Progress Note


Assessment/Plan: 





#Acute sepsis: due to Flu A and PNA





#Acute encephalopathy: min improvement in mental status. Holding Precedex/

Benzos. Reassess mental status. CTH was negative. TSH, B12 pending





#HCAP: MSSA, Klebs. Cefepime/Flagyl for possible aspiration





#Acute hypoxic resp failure: due to PNA





#Acute tachycardia: resolved





#Hypotension: resolved





#Thrombocytopenia: resolved. Due to acute illness





#Significant hypernatremia: resolved 





#Hyperglycemia: increase Glargine to 35 units in morning





#Diet: tubes feeds





#Diarrhea: related to TFs, negative C diff. Immodium





#DVT ppx: Lovenox





#Goals: has a friend of 50 yrs that is MDPOA. States that he has been declining 

for last several months and would not want to live like this. Need to continue 

conversations











Subjective: will follow simple commands like closing eyes and wiggling hands


Objective: 


 Vital Signs











Temp Pulse Resp BP Pulse Ox


 


 37.6 C   92   23 H  113/54 L  96 


 


 03/09/18 06:00  03/09/18 06:00  03/09/18 06:00  03/09/18 06:00  03/09/18 06:00








 Microbiology











 03/04/18 19:00  - Final





 Sputum, Expectorated Sputum Culture - Final





    Staphylococcus Aureus





    Klebsiella Pneumoniae





    Serratia Marcescens





    Pseudomonas Aeruginosa








 Laboratory Results





 03/09/18 04:30 





 03/09/18 04:30 





 











 03/08/18 03/09/18 03/10/18





 05:59 05:59 05:59


 


Intake Total 2825 2612 


 


Output Total 2350 4075 


 


Balance 475 -1463 








 











PT  16.9 SEC (12.0-15.0)  H  03/02/18  14:00    


 


INR  1.36  (0.83-1.16)  H  03/02/18  14:00    














- Physical Exam


Constitutional: uncomfortable


Eyes: PERRL


Ears, Nose, Mouth, Throat: moist mucous membranes


Cardiovascular: regular rate and rhythym


Respiratory: rhonchi


Gastrointestinal: normoactive bowel sounds


Genitourinary: kothari in urethra, other (rectal tube)


Skin: warm


Musculoskeletal: other (hands in mitts)


Neurologic: CN II-XII Intact


Psychiatric: encephalopathic





ICD10 Worksheet


Patient Problems: 


 Problems











Problem Status Onset


 


Atrial fibrillation Acute  


 


Hypothermia Acute  


 


Non-ketotic hyperosmolar coma Acute

## 2018-03-09 NOTE — PDINTPN
Intensivist Progress Note


Assessment/Plan: 


Assessment:





Influenza:  Improved/resolved.  Likely contributed initially to hypoxemia and 

delirium, but other factors now involved.





Hypoxemia/pneumonia/respiratory failure:  Bilateral infiltrates/atelectasis 

present on admission. Multifactorial, secondary to fluid shifts, Influenza, 

atelectasis, possible aspiration.  Oxygen now stable at 6 L, however new 

infiltrates developed 3/5:  Possibly secondary to recurrent aspiration, 

possibly new MSSA pneumonia, but also growing a Pseudomonas organism (sensitive 

to cefepime), Proteus, and Serratia.  However, respiratory status has not 

decompensated.  Infiltrates possibly related to fluid as well, as input 

significantly ahead of output.  Better over the last 24 hr with Lasix.  On 

cefepime and Flagyl.





Hypernatremia:  Resolved,Sodium 142. Follow intermittently





Hyperglycemia:  Glucoses will control on current regimen of Lantus and sliding 

scale coverage.





Hemodynamics:  Resolved hypotension.  





Nutrition: SBFT placed 3/2.  Tolerating TFs.





Tachycradia:  Sinus, low 100s to regular rate and rhythm.


 


Delirium/AMS: Persists, but significantly improved today.  Stopping Ativan and 

Precedex associated with this improvement, as well as time..  Multifactorial:  

Toxic metabolic, medications.  Neurologic consultation found no underlying 

abnormality.  Currently on p.r.n. Dilaudid alone.





Prognosis remains guarded and may be poor, however with improvement in mental 

status today continued therapy and current level of care seems appropriate.  He 

likely will not be able to go back to his assisted living.  A long-time friend 

and medical power of  who lives in [place] is involved.  It may be 

reasonable to transition to comfort care, possibly Hospice, if continued 

significant improvement is not possible.











Plan:  Continue care in the intensive care unit.  Continue supplemental oxygen.

  Continue Cefepime and Flagyl.  Await further sensitivities on multiple 

organisms from his sputum culture, but I suspect contamination.  Will continue 

Lasix if blood pressure and renal function tolerate to get a neutral fluid 

balance.  Continue Lantus and sliding scale insulin.  Continue p.r.n. Haldol 

and Dilaudid.  Follow lab, CXR. 





Plan is to continue present care there over the weekend and re-evaluate Monday.











35 min critical care time spent directly with the patient.  Discussed with 

nursing, hospitalist, ICU multi disciplinary team.

















Subjective: 





Less restless today and starting to have some verbal response.  Did receive 

some Dilaudid.  Has had no further Ativan comma Precedex or Haldol.


Objective: 





 Vital Signs











Temp Pulse Resp BP Pulse Ox


 


 37.3 C   75   14   101/54 L  97 


 


 03/09/18 12:00  03/09/18 12:00  03/09/18 12:00  03/09/18 12:00  03/09/18 12:00








 Microbiology











 03/04/18 19:00  - Final





 Sputum, Expectorated Sputum Culture - Final





    Staphylococcus Aureus





    Klebsiella Pneumoniae





    Serratia Marcescens





    Pseudomonas Aeruginosa








 Laboratory Results





 03/09/18 04:30 





 03/09/18 04:30 





 











 03/08/18 03/09/18 03/10/18





 05:59 05:59 05:59


 


Intake Total 2825 2612 


 


Output Total 2350 4075 


 


Balance 475 -1463 








 











PT  16.9 SEC (12.0-15.0)  H  03/02/18  14:00    


 


INR  1.36  (0.83-1.16)  H  03/02/18  14:00    














Physical Exam





- Physical Exam


General Appearance: mild distress (Less restless), thin, other (Opens eyes to 

voice, trying to talk but I cannot understand him.  Mitts in place.)


EENT: PERRL/EOMI, other (Nasal cannula at 6 L, no change.  NG tube in place)


Neck: normal inspection (No JVD)


Respiratory: lungs clear (Anteriorly), decreased breath sounds (At bases), 

rales (Few, scattered), No rhonchi, No wheezing


Cardiac/Chest: regular rate, rhythm, tachycardia (At times), systolic murmur, 

No gallop


Abdomen: normal bowel sounds, non-tender, soft, other (Tolerating tube feeding)


Male Genitalia: other (Villalobos catheter in place, improved urine output over the 

last 24 hr with Lasix.)


Rectal: other (Rectal tube in place secondary to diarrhea)


Skin: warm/dry, pallor, other (Excoriated in the perineal area, grade 1 

pressure sore over coccyx)


Extremities: pedal edema


Neuro/Psych: no motor/sensory deficits (Moves all extremities equally), 

cognition abnormalities (Improving, now having some verbal responses, trying to 

talk.)





ICD10 Worksheet


Patient Problems: 


 Problems











Problem Status Onset


 


Non-ketotic hyperosmolar coma Acute  


 


Hypothermia Acute  


 


Atrial fibrillation Acute

## 2018-03-10 LAB — PLATELET # BLD: 309 10^3/UL (ref 150–400)

## 2018-03-10 RX ADMIN — INSULIN LISPRO SCH UNITS: 100 INJECTION, SOLUTION INTRAVENOUS; SUBCUTANEOUS at 08:14

## 2018-03-10 RX ADMIN — LOPERAMIDE HYDROCHLORIDE SCH MG: 1 SOLUTION ORAL at 22:04

## 2018-03-10 RX ADMIN — ENOXAPARIN SODIUM SCH MG: 100 INJECTION SUBCUTANEOUS at 08:13

## 2018-03-10 RX ADMIN — FUROSEMIDE SCH MG: 10 INJECTION, SOLUTION INTRAMUSCULAR; INTRAVENOUS at 08:14

## 2018-03-10 RX ADMIN — DEXTROSE MONOHYDRATE SCH MLS: 5 INJECTION, SOLUTION INTRAVENOUS at 15:44

## 2018-03-10 RX ADMIN — LOPERAMIDE HYDROCHLORIDE SCH MG: 1 SOLUTION ORAL at 04:48

## 2018-03-10 RX ADMIN — INSULIN LISPRO SCH UNITS: 100 INJECTION, SOLUTION INTRAVENOUS; SUBCUTANEOUS at 22:05

## 2018-03-10 RX ADMIN — LOPERAMIDE HYDROCHLORIDE SCH MG: 1 SOLUTION ORAL at 17:11

## 2018-03-10 RX ADMIN — METOPROLOL TARTRATE SCH MG: 25 TABLET, FILM COATED ORAL at 22:04

## 2018-03-10 RX ADMIN — DEXTROSE MONOHYDRATE SCH MLS: 5 INJECTION, SOLUTION INTRAVENOUS at 04:49

## 2018-03-10 RX ADMIN — INSULIN GLARGINE SCH UNITS: 100 INJECTION, SOLUTION SUBCUTANEOUS at 08:50

## 2018-03-10 RX ADMIN — METOPROLOL TARTRATE SCH MG: 25 TABLET, FILM COATED ORAL at 08:14

## 2018-03-10 RX ADMIN — INSULIN LISPRO SCH UNITS: 100 INJECTION, SOLUTION INTRAVENOUS; SUBCUTANEOUS at 04:48

## 2018-03-10 RX ADMIN — INSULIN LISPRO SCH UNITS: 100 INJECTION, SOLUTION INTRAVENOUS; SUBCUTANEOUS at 17:11

## 2018-03-10 RX ADMIN — INSULIN LISPRO SCH UNITS: 100 INJECTION, SOLUTION INTRAVENOUS; SUBCUTANEOUS at 14:30

## 2018-03-10 RX ADMIN — LOPERAMIDE HYDROCHLORIDE SCH MG: 1 SOLUTION ORAL at 14:30

## 2018-03-10 RX ADMIN — DEXTROSE MONOHYDRATE SCH MLS: 5 INJECTION, SOLUTION INTRAVENOUS at 22:04

## 2018-03-10 RX ADMIN — Medication SCH MG: at 22:04

## 2018-03-10 RX ADMIN — FAMOTIDINE SCH MG: 20 TABLET, FILM COATED ORAL at 22:05

## 2018-03-10 RX ADMIN — INSULIN LISPRO SCH UNITS: 100 INJECTION, SOLUTION INTRAVENOUS; SUBCUTANEOUS at 00:51

## 2018-03-10 NOTE — PDINTPN
Intensivist Progress Note


Assessment/Plan: 


Assessment:





Influenza:  Resolved.  Likely contributed initially to hypoxemia and delirium, 

but other factors now involved.





Hypoxemia/pneumonia/respiratory failure:  Bilateral infiltrates/atelectasis 

present on admission. Multifactorial, secondary to fluid shifts, Influenza, 

atelectasis, and possible aspiration.  He developed new infiltrates developed 3/

5:  Possibly secondary to recurrent aspiration, fluid overload or possibly new 

MSSA pneumonia, but also growing a Pseudomonas organism (sensitive to cefepime)

, Proteus, and Serratia from a sputum culture.  However, respiratory status was 

not decompensated.  Chest x-ray is now improving an oxygen requirements have 

decreased from 6 L to 4 L.  On cefepime and Flagyl.





Hypernatremia:  Resolved, Sodium 141. Follow intermittently





Hyperglycemia:  Glucoses are adequately controlled on current regimen of Lantus 

and sliding scale coverage.





Hemodynamics:  Resolved hypotension.  





Nutrition: SBFT placed 3/2.  Tolerating TFs.





Tachycradia:  Sinus, low 100s to regular rate and rhythm.


 


Delirium/AMS: Persists, but significantly improved.  Stopping Ativan and 

Precedex associated with this improvement, as well as time.  Multifactorial:  

Toxic metabolic, medications.  Neurologic consultation found no underlying 

abnormality.  Currently on p.r.n. Dilaudid for pain and p.r.n. Haldol if needed.





Prognosis remains guarded and may be poor, however with improvement in mental 

status over the last few days continued therapy and current level of care seems 

appropriate.  He likely will not be able to go back to his assisted living.  A 

long-time friend and medical power of  who lives in [place] is 

involved.  It may be reasonable to transition to comfort care, possibly Hospice

, if continued significant improvement is not possible.  











Plan:  Continue supportive care in the intensive care unit.  Continue 

supplemental oxygen.  Continue Cefepime and Flagyl.   Will continue Lasix 

intermittently if blood pressure and renal function tolerate to get a neutral 

fluid balance.  Continue Lantus and sliding scale insulin.  Continue p.r.n. 

Haldol and Dilaudid.  Follow lab, CXR. 





The plan is to continue to provide the current level of supportive care through 

the weekend and re-evaluate on Monday, avoiding Ativan, Precedex and other 

similar medications.  Dilaudid should be continued at as low a dose as possible 

to provide pain relief, and use Haldol only for agitation.











30 min critical care time spent directly with the patient.  Discussed with 

nursing, hospitalist, ICU multi disciplinary team.











Subjective: 





Looks to voice, tries to talk.  Slightly less responsive, more lethargic today.


Objective: 





 Vital Signs











Temp Pulse Resp BP Pulse Ox


 


 37.6 C   77   20   99/49 L  94 


 


 03/10/18 10:00  03/10/18 10:00  03/10/18 10:00  03/10/18 10:00  03/10/18 10:00








 Laboratory Results





 03/10/18 04:00 





 03/10/18 04:00 





 











 03/09/18 03/10/18 03/11/18





 05:59 05:59 06:59


 


Intake Total 2612 1650 


 


Output Total 4075 2750 


 


Balance -1463 -1100 








 











PT  16.9 SEC (12.0-15.0)  H  03/02/18  14:00    


 


INR  1.36  (0.83-1.16)  H  03/02/18  14:00    








CXR:  Decreasing pulmonary infiltrates





Physical Exam





- Physical Exam


General Appearance: thin, other (Somnolent, arouses, appears comfortable.)


EENT: PERRL/EOMI, other (Nasal cannula at 4 L, decreased)


Neck: normal inspection (No obvious JVD)


Respiratory: lungs clear, decreased breath sounds (At bases), rales (Few rales 

bilaterally), No respiratory distress, No rhonchi, No wheezing


Cardiac/Chest: regular rate, rhythm


Abdomen: normal bowel sounds, non-tender, soft, other (Tolerating tube feeding)


Male Genitalia: other (Villalobos catheter in place, good urine output.  Output 

greater than input again for the last 24 hr with Lasix.)


Skin: warm/dry, pallor


Extremities: pedal edema (Trace +)


Neuro/Psych: no motor/sensory deficits (Moves extremities equally bilaterally, 

but weak), cognition abnormalities (Looks to voice, tries to mouth words but he 

is more lethargic today compared to yesterday.)





ICD10 Worksheet


Patient Problems: 


 Problems











Problem Status Onset


 


Non-ketotic hyperosmolar coma Acute  


 


Hypothermia Acute  


 


Atrial fibrillation Acute

## 2018-03-10 NOTE — ASMTCMCOM
CM Note

 

CM Note                       

Notes:

Chart reviewed. Patient in hyperosmolar coma. No sedation through weekend to assess any increase in 


responsiveness, CONSTANTIN Mathis lives in [facility]. If no improvement in cognition/function after 

weekend will revisit disposition and plan of care. CM to follow,

 

Date Signed:  03/10/2018 03:39 PM

Electronically Signed By:Trudy Hdez RN

## 2018-03-10 NOTE — HOSPPROG
Hospitalist Progress Note


Assessment/Plan: 





#Acute sepsis: due to Flu A and PNA





#Acute metabolic encephalopathy: min improvement in mental status. Holding 

Precedex/Benzos. Reassess mental status. CTH was negative. NL TSH, B12. 

Evaluated by Neuro and unable to do MRI with agitation and source likely 

metabolic. 





#HCAP: MSSA, Klebs. Cefepime/Flagyl for possible aspiration





#Acute hypoxic resp failure: due to PNA





#Acute tachycardia: resolved





#Hypokalemia: repleted





#Hypotension: resolved





#Thrombocytopenia: resolved. Due to acute illness





#Significant hypernatremia: resolved 





#Hyperglycemia: increase Glargine to 35 units in morning





#Diet: tubes feeds





#Diarrhea: related to TFs, negative C diff. Immodium





#DVT ppx: Lovenox





#Goals: has a friend of 50 yrs that is MDPOA. States that he has been declining 

for last several months and would not want to live like this. Need to continue 

conversations. If not improved, mostly likely pursue comfort measures. 

Discussed with Dr. Quarles











Subjective: no acute events


Objective: 


 Vital Signs











Temp Pulse Resp BP Pulse Ox


 


 37.6 C   77   20   99/49 L  94 


 


 03/10/18 10:00  03/10/18 10:00  03/10/18 10:00  03/10/18 10:00  03/10/18 10:00








 Laboratory Results





 03/10/18 04:00 





 03/10/18 04:00 





 











 03/09/18 03/10/18 03/11/18





 05:59 05:59 06:59


 


Intake Total 2612 1650 


 


Output Total 4075 2750 


 


Balance -1463 -1100 








 











PT  16.9 SEC (12.0-15.0)  H  03/02/18  14:00    


 


INR  1.36  (0.83-1.16)  H  03/02/18  14:00    














- Physical Exam


Constitutional: no apparent distress


Eyes: PERRL


Ears, Nose, Mouth, Throat: moist mucous membranes


Cardiovascular: regular rate and rhythym, no murmur, rub, or gallop


Respiratory: no respiratory distress


Gastrointestinal: normoactive bowel sounds


Genitourinary: kothari in urethra, other (rectal tube in place)


Skin: warm


Neurologic: CN II-XII Intact, other (will close eyes, stick out tongue. Garbled 

speech)





ICD10 Worksheet


Patient Problems: 


 Problems











Problem Status Onset


 


Atrial fibrillation Acute  


 


Hypothermia Acute  


 


Non-ketotic hyperosmolar coma Acute

## 2018-03-11 RX ADMIN — LOPERAMIDE HYDROCHLORIDE SCH MG: 1 SOLUTION ORAL at 06:01

## 2018-03-11 RX ADMIN — DEXTROSE MONOHYDRATE SCH MLS: 5 INJECTION, SOLUTION INTRAVENOUS at 06:00

## 2018-03-11 RX ADMIN — DEXTROSE MONOHYDRATE SCH MLS: 5 INJECTION, SOLUTION INTRAVENOUS at 14:24

## 2018-03-11 RX ADMIN — INSULIN LISPRO SCH UNITS: 100 INJECTION, SOLUTION INTRAVENOUS; SUBCUTANEOUS at 06:01

## 2018-03-11 RX ADMIN — INSULIN GLARGINE SCH UNITS: 100 INJECTION, SOLUTION SUBCUTANEOUS at 09:23

## 2018-03-11 RX ADMIN — INSULIN LISPRO SCH UNITS: 100 INJECTION, SOLUTION INTRAVENOUS; SUBCUTANEOUS at 21:04

## 2018-03-11 RX ADMIN — LOPERAMIDE HYDROCHLORIDE SCH MG: 1 SOLUTION ORAL at 16:44

## 2018-03-11 RX ADMIN — INSULIN LISPRO SCH UNITS: 100 INJECTION, SOLUTION INTRAVENOUS; SUBCUTANEOUS at 12:23

## 2018-03-11 RX ADMIN — INSULIN LISPRO SCH: 100 INJECTION, SOLUTION INTRAVENOUS; SUBCUTANEOUS at 16:43

## 2018-03-11 RX ADMIN — METOPROLOL TARTRATE SCH MG: 25 TABLET, FILM COATED ORAL at 21:02

## 2018-03-11 RX ADMIN — Medication PRN MG: at 06:01

## 2018-03-11 RX ADMIN — Medication SCH MG: at 21:02

## 2018-03-11 RX ADMIN — FUROSEMIDE SCH MG: 10 INJECTION, SOLUTION INTRAMUSCULAR; INTRAVENOUS at 09:22

## 2018-03-11 RX ADMIN — METOPROLOL TARTRATE SCH MG: 25 TABLET, FILM COATED ORAL at 09:22

## 2018-03-11 RX ADMIN — FAMOTIDINE SCH MG: 20 TABLET, FILM COATED ORAL at 21:02

## 2018-03-11 RX ADMIN — INSULIN LISPRO SCH UNITS: 100 INJECTION, SOLUTION INTRAVENOUS; SUBCUTANEOUS at 09:21

## 2018-03-11 RX ADMIN — ENOXAPARIN SODIUM SCH MG: 100 INJECTION SUBCUTANEOUS at 09:22

## 2018-03-11 RX ADMIN — LOPERAMIDE HYDROCHLORIDE SCH MG: 1 SOLUTION ORAL at 21:32

## 2018-03-11 RX ADMIN — INSULIN LISPRO SCH UNITS: 100 INJECTION, SOLUTION INTRAVENOUS; SUBCUTANEOUS at 00:50

## 2018-03-11 RX ADMIN — LOPERAMIDE HYDROCHLORIDE SCH MG: 1 SOLUTION ORAL at 12:23

## 2018-03-11 RX ADMIN — DEXTROSE MONOHYDRATE SCH MLS: 5 INJECTION, SOLUTION INTRAVENOUS at 21:26

## 2018-03-11 NOTE — HOSPPROG
Hospitalist Progress Note


Assessment/Plan: 





#Acute sepsis: resolved. Due to Flu A and PNA





#Acute metabolic encephalopathy: improved mental status mildly. Holding Precedex

/Benzos. Reassess mental status. CTH was negative. NL TSH, B12. Evaluated by 

Neuro and unable to do MRI with agitation and source likely metabolic. 





#HCAP: MSSA, Klebs. Cefepime/Flagyl for 14 days





#Acute hypoxic resp failure: due to PNA





#Acute tachycardia: resolved





#Hypokalemia: repleted





#Hypotension: resolved





#Thrombocytopenia: resolved. Due to acute illness





#Significant hypernatremia: resolved 





#Hyperglycemia: required significant SSI. Increase glargine 45 units 





#Diet: tubes feeds





#Diarrhea: related to TFs, negative C diff. Immodium scheduled





#DVT ppx: Lovenox





#Goals: has a friend of 50 yrs that is MDPOA. States that he has been declining 

for last several months and would not want to live like this. Need to continue 

conversations. If not improved, mostly likely pursue comfort measures. 

Discussed with Dr. Quarles











Subjective: following a few commands with RN


Objective: 


 Vital Signs











Temp Pulse Resp BP Pulse Ox


 


 37.0 C   81   24 H  112/58 L  93 


 


 03/11/18 12:00  03/11/18 12:00  03/11/18 12:00  03/11/18 12:00  03/11/18 12:00








 Laboratory Results





 03/10/18 04:00 





 03/11/18 04:30 





 











 03/10/18 03/11/18 03/12/18





 04:59 05:59 05:59


 


Intake Total   905


 


Output Total   


 


Balance   905








 











PT  16.9 SEC (12.0-15.0)  H  03/02/18  14:00    


 


INR  1.36  (0.83-1.16)  H  03/02/18  14:00    














- Physical Exam


Constitutional: no apparent distress


Eyes: PERRL


Ears, Nose, Mouth, Throat: moist mucous membranes


Cardiovascular: regular rate and rhythym


Respiratory: rhonchi


Gastrointestinal: normoactive bowel sounds, soft, non-tender abdomen


Genitourinary: no bladder fullness


Skin: warm


Musculoskeletal: full muscle strength


Neurologic: CN II-XII Intact


Psychiatric: encephalopathic (will follow some commands intermittently)





ICD10 Worksheet


Patient Problems: 


 Problems











Problem Status Onset


 


Atrial fibrillation Acute  


 


Hypothermia Acute  


 


Non-ketotic hyperosmolar coma Acute

## 2018-03-11 NOTE — PDINTPN
Intensivist Progress Note


Assessment/Plan: 


Assessment:





Influenza:  Resolved.  Likely contributed initially to hypoxemia and delirium, 

but other factors now involved.





Hypoxemia/pneumonia/respiratory failure:  Bilateral infiltrates/atelectasis 

present on admission. Multifactorial, secondary to fluid shifts, Influenza, 

atelectasis, and possible aspiration.  He developed new infiltrates developed 3/

5:  Possibly secondary to recurrent aspiration, fluid overload or possibly new 

MSSA pneumonia, but also growing a Pseudomonas organism (sensitive to cefepime)

, Proteus, and Serratia from a sputum culture.  However, respiratory status was 

not decompensated.  Chest x-ray is now improving an oxygen requirements have 

decreased from 6 L to 4 L.  On cefepime and Flagyl.





Hypernatremia:  Resolved, Sodium 141. Follow intermittently





Hyperglycemia:  Glucoses high at times.  On Lantus and sliding scale coverage.





Hemodynamics:  Resolved hypotension.  





Nutrition: SBFT placed 3/2. TFs at goal.





Tachycradia:  Resolved for the most part.  Regular rate now.


 


Delirium/AMS: Significantly improved.  Stopping Ativan and Precedex associated 

with this improvement, as well as time.  Multifactorial: Toxic metabolic, 

medications.  Neurologic consultation found no underlying abnormality.  

Currently on p.r.n. Dilaudid for pain and p.r.n. Haldol if needed, but not 

requiring much currently.





Prognosis remains guarded and may be poor, however with improvement in mental 

status over the last few days continued therapy and current level of care seems 

appropriate.  He likely will not be able to go back to his assisted living.  A 

long-time friend and medical power of  who lives in [place] is 

involved.  It may be reasonable to transition to comfort care, possibly Hospice

, if continued significant improvement is not possible.  











Plan:  Continue supportive care in the intensive care unit.  Continue 

supplemental oxygen.  Continue Cefepime and Flagyl times a total of 10 days.  

Day 7 today.  Will re-culture sputum by nasotracheal suction today.   Will 

continue Lasix intermittently if blood pressure and renal function tolerate to 

get a neutral fluid balance.  Increase Lantus, continue sliding scale insulin.  

Continue Haldol and Dilaudid at low doses if needed.  Follow lab, CXR in a.m.. 





The plan is to continue to provide the current level of supportive care through 

this weekend and re-evaluate on Monday, avoiding Ativan, Precedex and other 

similar medications.  Dilaudid should be continued at as low a dose as possible 

to provide pain relief, and use Haldol only for agitation.











25 min critical care time spent directly with the patient.  Discussed with 

nursing, hospitalist, ICU multi disciplinary team.














Subjective: 





Calm, more alert.  Answering simple questions appropriately.  Hard to 

understand when he tries to talk.  May have stated his age correctly when 

asked.  Denies pain


Objective: 





 Vital Signs











Temp Pulse Resp BP Pulse Ox


 


 37.2 C   87   27 H  103/53 L  94 


 


 03/11/18 14:00  03/11/18 14:00  03/11/18 14:00  03/11/18 14:00  03/11/18 14:00








 Laboratory Results





 03/10/18 04:00 





 03/11/18 04:30 





 











 03/10/18 03/11/18 03/12/18





 04:59 05:59 05:59


 


Intake Total   905


 


Output Total   


 


Balance   905








 











PT  16.9 SEC (12.0-15.0)  H  03/02/18  14:00    


 


INR  1.36  (0.83-1.16)  H  03/02/18  14:00    














Physical Exam





- Physical Exam


General Appearance: no apparent distress, thin, other (Comma tries to respond.  

Mitts in place)


EENT: PERRL/EOMI, other (Nasal cannula at 4 L)


Neck: normal inspection (No obvious JVD)


Respiratory: lungs clear (Anteriorly), decreased breath sounds (At bases), 

rhonchi (Some central rhonchi/congestion with cough), No rales, No wheezing


Cardiac/Chest: regular rate, rhythm, systolic murmur, No gallop


Abdomen: normal bowel sounds, non-tender, soft, other (Tolerating tube feedings 

at goal.  Rectal tube remains in place)


Male Genitalia: other (Villalobos catheter in place, good urine output.  Input 

equals output last 24 hr.)


Skin: warm/dry, pallor


Extremities: pedal edema (Trace +)


Neuro/Psych: no motor/sensory deficits (Appears to move all extremities equally)

, cognition abnormalities (Improving)





ICD10 Worksheet


Patient Problems: 


 Problems











Problem Status Onset


 


Non-ketotic hyperosmolar coma Acute  


 


Hypothermia Acute  


 


Atrial fibrillation Acute

## 2018-03-12 VITALS
OXYGEN SATURATION: 96 % | RESPIRATION RATE: 12 BRPM | HEART RATE: 81 BPM | SYSTOLIC BLOOD PRESSURE: 91 MMHG | DIASTOLIC BLOOD PRESSURE: 56 MMHG

## 2018-03-12 VITALS — TEMPERATURE: 98.6 F

## 2018-03-12 RX ADMIN — DEXTROSE MONOHYDRATE SCH MLS: 5 INJECTION, SOLUTION INTRAVENOUS at 05:13

## 2018-03-12 RX ADMIN — METOPROLOL TARTRATE SCH MG: 25 TABLET, FILM COATED ORAL at 08:57

## 2018-03-12 RX ADMIN — LOPERAMIDE HYDROCHLORIDE SCH MG: 1 SOLUTION ORAL at 12:13

## 2018-03-12 RX ADMIN — DEXTROSE MONOHYDRATE SCH MLS: 5 INJECTION, SOLUTION INTRAVENOUS at 14:20

## 2018-03-12 RX ADMIN — INSULIN LISPRO SCH UNITS: 100 INJECTION, SOLUTION INTRAVENOUS; SUBCUTANEOUS at 01:38

## 2018-03-12 RX ADMIN — Medication PRN MG: at 06:02

## 2018-03-12 RX ADMIN — INSULIN LISPRO SCH UNITS: 100 INJECTION, SOLUTION INTRAVENOUS; SUBCUTANEOUS at 12:13

## 2018-03-12 RX ADMIN — INSULIN LISPRO SCH UNITS: 100 INJECTION, SOLUTION INTRAVENOUS; SUBCUTANEOUS at 05:12

## 2018-03-12 RX ADMIN — INSULIN LISPRO SCH UNITS: 100 INJECTION, SOLUTION INTRAVENOUS; SUBCUTANEOUS at 15:51

## 2018-03-12 RX ADMIN — INSULIN LISPRO SCH UNITS: 100 INJECTION, SOLUTION INTRAVENOUS; SUBCUTANEOUS at 08:57

## 2018-03-12 RX ADMIN — LOPERAMIDE HYDROCHLORIDE SCH MG: 1 SOLUTION ORAL at 05:12

## 2018-03-12 RX ADMIN — FUROSEMIDE SCH MG: 10 INJECTION, SOLUTION INTRAMUSCULAR; INTRAVENOUS at 08:58

## 2018-03-12 RX ADMIN — ENOXAPARIN SODIUM SCH MG: 100 INJECTION SUBCUTANEOUS at 09:00

## 2018-03-12 RX ADMIN — LOPERAMIDE HYDROCHLORIDE SCH MG: 1 SOLUTION ORAL at 15:53

## 2018-03-12 NOTE — HOSPPROG
Hospitalist Progress Note


Assessment/Plan: 





#Acute sepsis: resolved. Due to Flu A and PNA





#Acute metabolic encephalopathy: improved mental status mildly. Holding Precedex

/Benzos. Reassess mental status. CTH was negative. NL TSH, B12. Evaluated by 

Neuro and unable to do MRI with agitation and source likely metabolic. 





#HCAP: MSSA, Klebs. Cefepime/Flagyl for 14 days





#Acute hypoxic resp failure: due to PNA





#Acute tachycardia: resolved





#Hypokalemia: repleted





#Hypotension: resolved





#Thrombocytopenia: resolved. Due to acute illness





#Significant hypernatremia: resolved 





#Hyperglycemia: required significant SSI. Increase glargine 45 units 





#Diet: tubes feeds





#Diarrhea: related to TFs, negative C diff. Immodium scheduled





#DVT ppx: Lovenox





#Goals: had good conversation with LEILANI Mathis. She has consistently said that 

he would not want to be in this state and would not want further procedures 

like PEG tube. She has chosen hospice care. Will d/w CM





Stable to transfer to floor








Time spent on visit: 45 min d/w Delfina, coordinating with CM and with 

interdisciplinary team


Subjective: no acute events. Not following commands


Objective: 


 Vital Signs











Temp Pulse Resp BP Pulse Ox


 


 37 C   67   20   99/51 L  95 


 


 03/12/18 10:00  03/12/18 10:00  03/12/18 10:00  03/12/18 10:00  03/12/18 10:00








 Microbiology











 03/11/18 14:50  - Final





 Sputum, Induced/Suctioned 








 Laboratory Results





 03/10/18 04:00 





 03/12/18 04:20 





 











 03/11/18 03/12/18 03/13/18





 05:59 05:59 05:59


 


Intake Total  3972 


 


Output Total  2650 


 


Balance  1322 








 











PT  16.9 SEC (12.0-15.0)  H  03/02/18  14:00    


 


INR  1.36  (0.83-1.16)  H  03/02/18  14:00    














- Physical Exam


Constitutional: no apparent distress


Eyes: PERRL


Ears, Nose, Mouth, Throat: moist mucous membranes


Cardiovascular: regular rate and rhythym


Respiratory: no respiratory distress


Gastrointestinal: normoactive bowel sounds, other (no pain/grimace with 

palpitations)


Genitourinary: other (rectal tube)


Skin: warm


Musculoskeletal: other (hands on mits)


Neurologic: other (eyes open. Not tracking, not following simple commands)


Psychiatric: encephalopathic





ICD10 Worksheet


Patient Problems: 


 Problems











Problem Status Onset


 


Atrial fibrillation Acute  


 


Hypothermia Acute  


 


Non-ketotic hyperosmolar coma Acute

## 2018-03-12 NOTE — ASDISCHSUM
----------------------------------------------

Discharge Information

----------------------------------------------

Plan Status:SNF                                      Medically Cleared to Leave:03/12/2018

Discharge Date:03/12/2018                            CM D/C Disposition:Skilled Nursing Facility

ADT D/C Disposition:Skilled Nursing Facility         Projected Discharge Date:03/12/2018 05:00 PM

Transportation at D/C:ALS/BLS                        Discharge Delay Reason:

Follow-Up Date:03/12/2018 05:00 PM                   Discharge Slot:

Final Diagnosis:Acute sepsis, Flu A, PNA, Encephalopathy

----------------------------------------------

Placement Information

----------------------------------------------

Referral Type:*Nursing Home/SNF                      Referral ID:[id number]

Provider Name:[facility]/JOSE ELIAS [facility]

Address 1:[address]                             Phone [phone]

Address 2:                                           Fax [phone]

City:[place]                                         Selection Factors:

State:[place]

 

----------------------------------------------

Patient Contact Information

----------------------------------------------

Contact Name:NAKUL                             Relationship:Friend

Address:[address]                                  Home [phone]

                                                     Work Phone:

City:[place]                                         Alternate Phone:

[facility]/Zip Code:[place] [address]                              Email:

----------------------------------------------

Financial Information

----------------------------------------------

Financial Class:Medicare

Primary Plan Desc:MEDICARE INPATIENT                 Primary Plan Number:[id number]

Secondary Plan Desc:[place]      Secondary Plan Number:[id number]

 

 

----------------------------------------------

Assessment Information

----------------------------------------------

----------------------------------------------

[facility] CM Progress Note

----------------------------------------------

CM Note

 

CM Note                       

Notes:

82 yr old male found confused in his Independent Living apartment in [facility]. High blood 

sugars-greater than 1000, confused and agitated. [facility] looking for Advance Directives and 

contacts, so far none found. CM to follow.

 

Date Signed:  02/26/2018 09:17 AM

Electronically Signed By:Harika Ho LCSW

 

 

----------------------------------------------

[facility] CM Progress Note

----------------------------------------------

CM Note

 

CM Note                       

Notes:

[facility] called back to say that she had given me the wrong # for a "" for the 

patient. This CM contacted Delfina Meredith [phone] ; [phone] . Delfina reports that 

patient has no living family and as far as she knew was his only living friend. She was happy to 

assist by being patient's Med Proxy. Delfina lives in [facility]. This CM sent her a picture of 


the Med Proxy form and instructions, asked the ICU RN to call her and gave her phone#'s to the 

Intensivist to call as needed.

 

Date Signed:  02/26/2018 04:06 PM

Electronically Signed By:Harika Ho LCSW

 

 

----------------------------------------------

[facility] Progress Note

----------------------------------------------

 Note

 

CM Note                       

Notes:

Patient continues to be confused, labs unchanged, non verbal, at times 

contracted, restrained. Patient had [facility] assisting him at home. Patient has not been 

able to work with Therapies given his confusion. May need SNF on discharge. CM to follow.

 

Date Signed:  02/27/2018 03:13 PM

Electronically Signed By:Harika Ho LCSW

 

 

----------------------------------------------

[facility] Progress Note

----------------------------------------------

 Note

 

CM Note                       

Notes:

Spoke with Lenora at [facility] - patient is currently open with PT/OT/RN/CNA 

services. They will need orders to resume care.



Patient remains very ill with electrolyte imbalances, increased respiratory needs, and a positive 

Influenza A test. He has not been out of bed. We will order therapies when appropriate. 

 

Date Signed:  02/28/2018 01:18 PM

Electronically Signed By:Sarah Beasley RN

 

 

----------------------------------------------

[facility] CM Progress Note

----------------------------------------------

CM Note

 

CM Note                       

Notes:

Patient is improving slowly. Increase tamiflu dose today for renal function. Patient's family has 

remained steadily at his bedside. D/C plan remains [facility] for 

PT/OT/RN/CNA. CM will follow.

 

Date Signed:  03/01/2018 02:16 PM

Electronically Signed By:Julieth Pablo LCSW

 

 

----------------------------------------------

[facility] CM Progress Note

----------------------------------------------

CM Note

 

CM Note                       

Notes:

Unable to meet with patient as he is delirious and not answering questions, even though patient is 

clinically improved. Patient is a resident at [facility] and has had [facility] in the 


past. D/C needs remain TBD. CM will follow.

 

Date Signed:  03/02/2018 01:20 PM

Electronically Signed By:Julieth Pablo LCSW

 

 

----------------------------------------------

[facility] CM Progress Note

----------------------------------------------

CM Note

 

CM Note                       

Notes:

Contacted [facility] , [phone] and spoke w/Bernardo Bray RN, who reported that patient had 

been under their care for the past 3yrs and Kaylen Barrera RN had been visiting him 1x/month 


during those years. They knew of his friend, Delfina in [place]. Bernardo reports that Delfina was his 

only friend and she didn't know of any family. Above RN's report that patient wouldn't want to be 

kept alive if he was confused and wouldn't have a meaningful life. Patient also had [employer] [phone] assisting him at home. Laura, the company Director will contact this CM 

Tuesday to confirm patient's thoughts and feelings.

 

Date Signed:  03/05/2018 04:06 PM

Electronically Signed By:Harika Ho LCSW

 

 

----------------------------------------------

[facility] CM Progress Note

----------------------------------------------

CM Note

 

CM Note                       

Notes:

A family meeting has been set  up for tomorrow 3-8-2018 at 12:30 with patient's MDPOA, Delfina. Delfina 


will be on a telephone conference since she lives in [place], [place]. CM will follow.

 

Date Signed:  03/07/2018 04:29 PM

Electronically Signed By:Julieth Pablo LCSW

 

 

----------------------------------------------

[facility] CM Progress Note

----------------------------------------------

CM Note

 

CM Note                       

Notes:

Consulted with Dr. Quarles and Dr. Oquendo regarding recommendations for going forward with patient's 

medical care. Dr. Oquendo to make some medication changes to determine if their might be some 

improvement in patient's cognitive abilities. Comfort measures vs. more medical intervention is 

being considered. Family meeting was held with Delfina, patient's MDPOA, Marcy Gunderson, , and myself. We informed Delfina of Dr. Oquendo making some medication changes to 

see if patient cognitively improves. Delfina states patient would want quality of life and has only a 


few things left he enjoys. Patient enjoys reading, intellectual conversation and his home 

caregivers from [facility] who have been involved with the patient for the past 3 

years. Currently, d/c plan may be hospice support or SNF placement. We will contact Delfina on Monday 


to update and make decisions. Delfina requested Dr. Oquendo give her a call about the medication 

changes. This request was passed on to Dr. Oquendo. CM will follow.

 

Date Signed:  03/08/2018 02:06 PM

Electronically Signed By:Julieth Pablo LCSW

 

 

----------------------------------------------

[facility] Progress Note

----------------------------------------------

CM Note

 

CM Note                       

Notes:

Chart reviewed. Patient in hyperosmolar coma. No sedation through weekend to assess any increase in 


responsiveness, CONSTANTIN Mathis lives in [facility]. If no improvement in cognition/function after 

weekend will revisit disposition and plan of care. CM to follow,

 

Date Signed:  03/10/2018 03:39 PM

Electronically Signed By:Truyd Hdez RN

 

 

----------------------------------------------

Case Management Discharge Plan Note

----------------------------------------------

Case Management Discharge

 

Discharge Order Complete?     Answers:  Yes                                   

Patient to Obtain             Answers:  Other                         Notes:  [facility]

Medications                                                                   

Transportation Arranged       Answers:  [facility]                         

Transport will Pick (Date     03/12/2018 05:00 PM

& Time)                       

Case Management Transport     Answers:  Yes                                   

Form Complete                                                                 

Faxed Final Orders            Answers:  Yes                                   

Agency/Facility Transfer      Answers:  Yes                                   

Report Printed & Faxed to                                                     

Receiving Agency                                                              

Family Notified               Answers:  Yes                           Notes:  Delfina Meredith

Discharge Comments            

Notes:

Patient to discharge to [facility] they are admitting him under Medicare Comfort Care, to be 

followed by [facility].

 

Date Signed:  03/12/2018 03:29 PM

Electronically Signed By:Harika Ho LCSW

 

 

----------------------------------------------

Intervention Information

----------------------------------------------

Intervention Type:IM-Pt. Not Available               Date of Service:03/12/2018 02:39 PM

Patient Type:Inpatient                               Staff Member:Kecia Escalona

Hours:                                               Discipline:

Severity:                                            Comment:Discussed patient with discharge plann
ing social 

                                                              workerKathya. Patient is in hyperosmol
ar 

                                                              coma and unable to communicate for 

                                                              Medicare form to be administered.

## 2018-03-12 NOTE — PDIAF
- Diagnosis


Diagnosis: hyperglycemia


Code Status: Do Not Resuscitate





- Medication Management


Discharge Medications: 


 Medications to Continue on Transfer





Famotidine [Pepcid 20 MG (*)] 20 mg TUBE HS  tab 03/12/18 [Last Taken Unknown]


Furosemide [Lasix Injection] 20 mg IVP DAILY  vial 03/12/18 [Last Taken Unknown]


Insulin Glargine [Lantus Syringe] 45 units SC DAILY  unit 03/12/18 [Last Taken 

Unknown]


Insulin Lispro [HumaLOG LISPRO] 0 unit SC Q4H #0 unit 03/12/18 [Last Taken 

Unknown]


Ipratropium/Albuterol [Duoneb (*)] 3 ml IH Q6HRS PRN  deyvial 03/12/18 [Last 

Taken Unknown]


Metoprolol Tartrate [Lopressor 25 mg (*)] 50 mg TUBE BID  tab 03/12/18 [Last 

Taken Unknown]


OLANZapine [OLANZapine (*)] 5 mg TUBE HS  tab 03/12/18 [Last Taken Unknown]








Discharge Medications: Refer to the Discharge Home Medication list for PRN 

reason.





- Orders


Services needed: Registered Nurse, Speech Language Pathologist


Isolation Type: None


Diet Recommendation: other (NPO)


Diet Texture: None


Tube feeding: stop





- Follow Up Care


Current Providers and Referrals: 


Patient,NotPresent [Unknown] - As per Instructions

## 2018-03-12 NOTE — GDS
[f rep st]



                                                             DISCHARGE SUMMARY





DISCHARGE DIAGNOSES:  

1.  Acute encephalopathy, persistent.

2.  Severe hyperglycemia.

3.  Severe hypernatremia.

4.  Healthcare-associated pneumonia.

5.  Acute metabolic encephalopathy.

6.  Acute hypoxic respiratory failure.

7.  Acute tachycardia.

8.  Hyperkalemia.

9.  Hypotension.

10.  Thrombocytopenia.

11.  Influenza A.

12.  Acute sepsis.



HISTORY OF PRESENT ILLNESS:  An 82-year-old male with no known past medical history who presented fro
m his independent living apartment at [facility] after a welfare check was called.  Apparently, he h
ad not been seen in 24 hours and he was found in his apartment confused.  There were no prescription 
bottles in his home.  Initial labs showed a glucose of 1023 and a sodium of 163.  He was transferred 
to the ICU for further care.



HOSPITAL COURSE BY PROBLEM:  

1.  Acute metabolic encephalopathy secondary to severe hyperglycemia/hypernatremia.  CT head was nega
tive for acute stroke.  He had normal reversible causes including TSH and B12.  He was evaluated by N
eurology, was unable to perform an MRI due to agitation and thought to be low yield given metabolic e
tiology.  He has had minimal improvements at this point.  He is not following commands or talking.

2.  Acute sepsis:  This was secondary to influenza A pneumonia.  This has since resolved.

3.  Healthcare-associated pneumonia:  Culture positive for MSSA and Klebsiella.  Received cefepime an
d Flagyl.

4.  Influenza A, status post treatment.

5.  Acute hypoxic respiratory failure secondary to pneumonia.  This has improved.  He received a full
 treatment course.

6.  Acute tachycardia, resolved.

7.  Hypokalemia, repleted.

8.  Hypotension, resolved with IV fluids.

9.  Thrombocytopenia secondary to acute illness.  This has resolved.

10.  Significant hypernatremia.  This was corrected with no improvement in mental status. 

11.  Hyperglycemia.  Will continue glargine 45 units and sliding scale insulin.

12.  Diet.  Was on tube feeds here but will discontinue these.

13.  Diarrhea related to tube feeds and C difficile is negative.  He has Imodium scheduled.

14.  Goals.  The patient does not have a family, but has a friend of 50 years, Delfina, who lives in [place], who is his MD POA.  She has followed along during this process.  I spoke with her today and
 has consistently said that he would not be in a state of mind where he cannot talk or read.  He woul
d not want to proceed with procedures like a PEG tube to prolong his life.  At this point, given that
 he has had minimal to no improvement in his mental status, have chosen for palliative care.  He will
 be transferred to [facility] and palliative care and then transitioned to hospice if needed.



DISPOSITION:  The patient is stable for discharge to [facility] with palliative care.



MEDICATIONS:  Continue glargine sliding scale insulin, Imodium.





Job #:  [id number]

## 2018-03-13 ENCOUNTER — HOSPITAL ENCOUNTER (EMERGENCY)
Dept: HOSPITAL 80 - FED | Age: 83
End: 2018-03-13
Payer: COMMERCIAL

## 2018-03-13 VITALS — SYSTOLIC BLOOD PRESSURE: 48 MMHG | HEART RATE: 40 BPM | DIASTOLIC BLOOD PRESSURE: 28 MMHG

## 2018-03-13 VITALS — OXYGEN SATURATION: 96 %

## 2018-03-13 DIAGNOSIS — I46.9: Primary | ICD-10-CM

## 2018-03-13 DIAGNOSIS — Z79.4: ICD-10-CM

## 2018-03-13 PROCEDURE — 96374 THER/PROPH/DIAG INJ IV PUSH: CPT

## 2018-03-13 PROCEDURE — 99284 EMERGENCY DEPT VISIT MOD MDM: CPT

## 2018-03-13 PROCEDURE — 92950 HEART/LUNG RESUSCITATION CPR: CPT

## 2018-03-13 PROCEDURE — 36680 INSERT NEEDLE BONE CAVITY: CPT

## 2018-03-13 NOTE — EDPHY
H & P


Time Seen by Provider: 18 08:49





- Medical/Surgical History


Other PMH: unobtainable





- Social History


Smoking Status: Unknown if ever smoked


Constitutional: 


 Initial Vital Signs











O2 Sat (%)  96   18 08:35








 











O2 (L/minute)                  10














Allergies/Adverse Reactions: 


 





Unable to Assess Allergy (Verified 18 04:32)


 








Home Medications: 














 Medication  Instructions  Recorded


 


Famotidine [Pepcid 20 MG (*)] 20 mg TUBE HS  tab 18


 


Furosemide [Lasix Injection] 20 mg IVP DAILY  vial 18


 


Insulin Glargine [Lantus Syringe] 45 units SC DAILY  unit 18


 


Insulin Lispro [HumaLOG LISPRO] 0 unit SC Q4H #0 unit 18


 


Ipratropium/Albuterol [Duoneb (*)] 3 ml IH Q6HRS PRN  deyvial 18


 


Loperamide HCl [Loperamide] 1 mg PO QID #30 liquid 18


 


Melatonin [Melatonin 3 MG (*)] 1.5 mg TUBE HS  tab 18


 


Metoprolol Tartrate [Lopressor 25 50 mg TUBE BID  tab 18





mg (*)]  


 


OLANZapine [OLANZapine (*)] 5 mg TUBE HS  tab 18














Medical Decision Making


ED Course/Re-evaluation: 


Patient arrived via EMS with complaint of epistaxis, AMS, and pulling out [place] 

catheter. Had not yet assessed patient when RN alerted me to immediate need for 

resuscitation. Minimal information available from EMS. No obvious DNR available 

in transfer paperwork from [facility]. 





0835: Initiated CPR and transferred to trauma bay. Reviewing recent 3-week ICU 

admission (for severe sepsis, encephalopathy, pneumonia, DKA) to determine COR 

status. 





0838: Pulse present, breathing spontaneously, unconscious. CPR stopped. Still 

searching for DNR paperwork. 





0840: RSI performed. Blood noted in airway.





0846: Atropine administered for bradycardia.





0848: RN called [facility] and confirmed DNR status, which is also visible on 

patient's med sheet from facility. Confirmed via recent records from yesterday 

that [place] not [place] interventions and that patient 

was discharged to hospice care. Resuscitation efforts halted and ET tube 

pulled. 





Recent ICU admission records reviewed. Confirmed in multiple hospitalist and CM 

notes that patient's MDPOA, Delfina in [place], "has consistently said that he 

would not want to be in this state and would not want further procedures [...]. 

She has chosen hospice care." - per progress note  18. Per CM note , "patient to discharge to [facility] they are admitting him under Medicare 

[facility] to be followed by [facility]."





0850: BP 48/28.





0900: Time of death called. 











- Data Points


Medications Given: 


 








Discontinued Medications





Atropine Sulfate (Atropine 1 Mg/10 Ml Syringe)  1 mg IVP EDNOW ONE


   Stop: 18 09:46


   Last Admin: 18 08:46 Dose:  1 mg








Departure





- Departure


Disposition: 


Clinical Impression: 


 Cardiac arrest





Condition: Critical


Referrals: 


Patient,NotPresent [Primary Care Provider] - As per Instructions


Report Scribed for: Bryson Salguero


Report Scribed by: Ivette Butt


Date of Report: 18


Time of Report: 09:11
